# Patient Record
Sex: FEMALE | Race: BLACK OR AFRICAN AMERICAN | Employment: OTHER | URBAN - METROPOLITAN AREA
[De-identification: names, ages, dates, MRNs, and addresses within clinical notes are randomized per-mention and may not be internally consistent; named-entity substitution may affect disease eponyms.]

---

## 2018-12-10 PROBLEM — N18.9 CHRONIC KIDNEY DISEASE: Status: ACTIVE | Noted: 2018-12-10

## 2020-07-05 ENCOUNTER — HOSPITAL ENCOUNTER (INPATIENT)
Age: 85
LOS: 4 days | Discharge: OTHER INSTITUTION | DRG: 871 | End: 2020-07-09
Attending: EMERGENCY MEDICINE | Admitting: FAMILY MEDICINE
Payer: MEDICARE

## 2020-07-05 ENCOUNTER — APPOINTMENT (OUTPATIENT)
Dept: GENERAL RADIOLOGY | Age: 85
DRG: 871 | End: 2020-07-05
Payer: MEDICARE

## 2020-07-05 ENCOUNTER — APPOINTMENT (OUTPATIENT)
Dept: CT IMAGING | Age: 85
DRG: 871 | End: 2020-07-05
Payer: MEDICARE

## 2020-07-05 PROBLEM — J18.9 PNEUMONIA: Status: ACTIVE | Noted: 2020-07-05

## 2020-07-05 LAB
A/G RATIO: 0.7 (ref 1.1–2.2)
ALBUMIN SERPL-MCNC: 3.3 G/DL (ref 3.4–5)
ALP BLD-CCNC: 77 U/L (ref 40–129)
ALT SERPL-CCNC: 28 U/L (ref 10–40)
ANION GAP SERPL CALCULATED.3IONS-SCNC: 11 MMOL/L (ref 3–16)
AST SERPL-CCNC: 58 U/L (ref 15–37)
BACTERIA: ABNORMAL /HPF
BASOPHILS ABSOLUTE: 0 K/UL (ref 0–0.2)
BASOPHILS RELATIVE PERCENT: 0.5 %
BILIRUB SERPL-MCNC: 0.3 MG/DL (ref 0–1)
BILIRUBIN URINE: NEGATIVE
BLOOD, URINE: ABNORMAL
BUN BLDV-MCNC: 21 MG/DL (ref 7–20)
CALCIUM SERPL-MCNC: 10.4 MG/DL (ref 8.3–10.6)
CHLORIDE BLD-SCNC: 102 MMOL/L (ref 99–110)
CLARITY: CLEAR
CO2: 28 MMOL/L (ref 21–32)
COLOR: YELLOW
CREAT SERPL-MCNC: 1.7 MG/DL (ref 0.6–1.2)
D DIMER: >5250 NG/ML DDU (ref 0–229)
EOSINOPHILS ABSOLUTE: 0 K/UL (ref 0–0.6)
EOSINOPHILS RELATIVE PERCENT: 0.3 %
EPITHELIAL CELLS, UA: ABNORMAL /HPF (ref 0–5)
FIBRINOGEN: 729 MG/DL (ref 200–397)
FIBRINOGEN: 729 MG/DL (ref 200–397)
GFR AFRICAN AMERICAN: 34
GFR NON-AFRICAN AMERICAN: 28
GLOBULIN: 5 G/DL
GLUCOSE BLD-MCNC: 137 MG/DL (ref 70–99)
GLUCOSE URINE: NEGATIVE MG/DL
HCT VFR BLD CALC: 41.1 % (ref 36–48)
HEMOGLOBIN: 14 G/DL (ref 12–16)
HYALINE CASTS: ABNORMAL /LPF (ref 0–2)
INR BLD: 1.09 (ref 0.86–1.14)
KETONES, URINE: NEGATIVE MG/DL
LACTATE DEHYDROGENASE: 172 U/L (ref 100–190)
LACTIC ACID, SEPSIS: 1.9 MMOL/L (ref 0.4–1.9)
LEUKOCYTE ESTERASE, URINE: NEGATIVE
LYMPHOCYTES ABSOLUTE: 1.8 K/UL (ref 1–5.1)
LYMPHOCYTES RELATIVE PERCENT: 22.2 %
MCH RBC QN AUTO: 32.1 PG (ref 26–34)
MCHC RBC AUTO-ENTMCNC: 34 G/DL (ref 31–36)
MCV RBC AUTO: 94.6 FL (ref 80–100)
MICROSCOPIC EXAMINATION: YES
MONOCYTES ABSOLUTE: 0.9 K/UL (ref 0–1.3)
MONOCYTES RELATIVE PERCENT: 11.1 %
NEUTROPHILS ABSOLUTE: 5.3 K/UL (ref 1.7–7.7)
NEUTROPHILS RELATIVE PERCENT: 65.9 %
NITRITE, URINE: NEGATIVE
PDW BLD-RTO: 14.4 % (ref 12.4–15.4)
PH UA: 8.5 (ref 5–8)
PLATELET # BLD: 262 K/UL (ref 135–450)
PMV BLD AUTO: 9.3 FL (ref 5–10.5)
POTASSIUM REFLEX MAGNESIUM: 6 MMOL/L (ref 3.5–5.1)
POTASSIUM SERPL-SCNC: 5.1 MMOL/L (ref 3.5–5.1)
PRO-BNP: 132 PG/ML (ref 0–449)
PROCALCITONIN: 0.06 NG/ML (ref 0–0.15)
PROTEIN UA: 100 MG/DL
PROTHROMBIN TIME: 12.6 SEC (ref 10–13.2)
RBC # BLD: 4.34 M/UL (ref 4–5.2)
RBC UA: ABNORMAL /HPF (ref 0–4)
REASON FOR REJECTION: NORMAL
REJECTED TEST: NORMAL
SODIUM BLD-SCNC: 141 MMOL/L (ref 136–145)
SPECIFIC GRAVITY UA: >=1.03 (ref 1–1.03)
TOTAL PROTEIN: 8.3 G/DL (ref 6.4–8.2)
TROPONIN: <0.01 NG/ML
URINE REFLEX TO CULTURE: NO
URINE TYPE: ABNORMAL
UROBILINOGEN, URINE: 0.2 E.U./DL
WBC # BLD: 8 K/UL (ref 4–11)

## 2020-07-05 PROCEDURE — 86140 C-REACTIVE PROTEIN: CPT

## 2020-07-05 PROCEDURE — 87040 BLOOD CULTURE FOR BACTERIA: CPT

## 2020-07-05 PROCEDURE — 87449 NOS EACH ORGANISM AG IA: CPT

## 2020-07-05 PROCEDURE — 83615 LACTATE (LD) (LDH) ENZYME: CPT

## 2020-07-05 PROCEDURE — 6360000002 HC RX W HCPCS: Performed by: EMERGENCY MEDICINE

## 2020-07-05 PROCEDURE — 85379 FIBRIN DEGRADATION QUANT: CPT

## 2020-07-05 PROCEDURE — 96374 THER/PROPH/DIAG INJ IV PUSH: CPT

## 2020-07-05 PROCEDURE — 83605 ASSAY OF LACTIC ACID: CPT

## 2020-07-05 PROCEDURE — 84132 ASSAY OF SERUM POTASSIUM: CPT

## 2020-07-05 PROCEDURE — 83880 ASSAY OF NATRIURETIC PEPTIDE: CPT

## 2020-07-05 PROCEDURE — 85384 FIBRINOGEN ACTIVITY: CPT

## 2020-07-05 PROCEDURE — 85610 PROTHROMBIN TIME: CPT

## 2020-07-05 PROCEDURE — 1200000000 HC SEMI PRIVATE

## 2020-07-05 PROCEDURE — 82728 ASSAY OF FERRITIN: CPT

## 2020-07-05 PROCEDURE — 80053 COMPREHEN METABOLIC PANEL: CPT

## 2020-07-05 PROCEDURE — 93005 ELECTROCARDIOGRAM TRACING: CPT | Performed by: EMERGENCY MEDICINE

## 2020-07-05 PROCEDURE — 70450 CT HEAD/BRAIN W/O DYE: CPT

## 2020-07-05 PROCEDURE — 81001 URINALYSIS AUTO W/SCOPE: CPT

## 2020-07-05 PROCEDURE — 85025 COMPLETE CBC W/AUTO DIFF WBC: CPT

## 2020-07-05 PROCEDURE — 84145 PROCALCITONIN (PCT): CPT

## 2020-07-05 PROCEDURE — 84484 ASSAY OF TROPONIN QUANT: CPT

## 2020-07-05 PROCEDURE — 36415 COLL VENOUS BLD VENIPUNCTURE: CPT

## 2020-07-05 PROCEDURE — 71045 X-RAY EXAM CHEST 1 VIEW: CPT

## 2020-07-05 PROCEDURE — U0002 COVID-19 LAB TEST NON-CDC: HCPCS

## 2020-07-05 PROCEDURE — 2580000003 HC RX 258: Performed by: EMERGENCY MEDICINE

## 2020-07-05 PROCEDURE — 99285 EMERGENCY DEPT VISIT HI MDM: CPT

## 2020-07-05 PROCEDURE — 94760 N-INVAS EAR/PLS OXIMETRY 1: CPT

## 2020-07-05 PROCEDURE — U0003 INFECTIOUS AGENT DETECTION BY NUCLEIC ACID (DNA OR RNA); SEVERE ACUTE RESPIRATORY SYNDROME CORONAVIRUS 2 (SARS-COV-2) (CORONAVIRUS DISEASE [COVID-19]), AMPLIFIED PROBE TECHNIQUE, MAKING USE OF HIGH THROUGHPUT TECHNOLOGIES AS DESCRIBED BY CMS-2020-01-R: HCPCS

## 2020-07-05 RX ORDER — ACETAMINOPHEN 650 MG/1
650 SUPPOSITORY RECTAL EVERY 6 HOURS PRN
Status: DISCONTINUED | OUTPATIENT
Start: 2020-07-05 | End: 2020-07-09 | Stop reason: HOSPADM

## 2020-07-05 RX ORDER — ALBUTEROL SULFATE 90 UG/1
2 AEROSOL, METERED RESPIRATORY (INHALATION)
Status: DISCONTINUED | OUTPATIENT
Start: 2020-07-06 | End: 2020-07-07

## 2020-07-05 RX ORDER — PROMETHAZINE HYDROCHLORIDE 25 MG/1
12.5 TABLET ORAL EVERY 6 HOURS PRN
Status: DISCONTINUED | OUTPATIENT
Start: 2020-07-05 | End: 2020-07-09 | Stop reason: HOSPADM

## 2020-07-05 RX ORDER — PRAVASTATIN SODIUM 40 MG
40 TABLET ORAL DAILY
Status: DISCONTINUED | OUTPATIENT
Start: 2020-07-06 | End: 2020-07-09 | Stop reason: HOSPADM

## 2020-07-05 RX ORDER — LOSARTAN POTASSIUM 100 MG/1
100 TABLET ORAL DAILY
Status: ON HOLD | COMMUNITY
End: 2020-07-05 | Stop reason: ALTCHOICE

## 2020-07-05 RX ORDER — AMLODIPINE BESYLATE AND BENAZEPRIL HYDROCHLORIDE 10; 20 MG/1; MG/1
1 CAPSULE ORAL DAILY
COMMUNITY

## 2020-07-05 RX ORDER — SODIUM CHLORIDE 0.9 % (FLUSH) 0.9 %
10 SYRINGE (ML) INJECTION PRN
Status: DISCONTINUED | OUTPATIENT
Start: 2020-07-05 | End: 2020-07-09 | Stop reason: HOSPADM

## 2020-07-05 RX ORDER — IPRATROPIUM BROMIDE AND ALBUTEROL SULFATE 2.5; .5 MG/3ML; MG/3ML
1 SOLUTION RESPIRATORY (INHALATION)
Status: DISCONTINUED | OUTPATIENT
Start: 2020-07-06 | End: 2020-07-05 | Stop reason: ALTCHOICE

## 2020-07-05 RX ORDER — BUDESONIDE AND FORMOTEROL FUMARATE DIHYDRATE 80; 4.5 UG/1; UG/1
2 AEROSOL RESPIRATORY (INHALATION) 2 TIMES DAILY
Status: DISCONTINUED | OUTPATIENT
Start: 2020-07-05 | End: 2020-07-09 | Stop reason: HOSPADM

## 2020-07-05 RX ORDER — POLYETHYLENE GLYCOL 3350 17 G/17G
17 POWDER, FOR SOLUTION ORAL DAILY PRN
Status: DISCONTINUED | OUTPATIENT
Start: 2020-07-05 | End: 2020-07-09 | Stop reason: HOSPADM

## 2020-07-05 RX ORDER — DEXAMETHASONE SODIUM PHOSPHATE 10 MG/ML
8 INJECTION, SOLUTION INTRAMUSCULAR; INTRAVENOUS ONCE
Status: COMPLETED | OUTPATIENT
Start: 2020-07-05 | End: 2020-07-05

## 2020-07-05 RX ORDER — ONDANSETRON 2 MG/ML
4 INJECTION INTRAMUSCULAR; INTRAVENOUS EVERY 6 HOURS PRN
Status: DISCONTINUED | OUTPATIENT
Start: 2020-07-05 | End: 2020-07-09 | Stop reason: HOSPADM

## 2020-07-05 RX ORDER — ACETAMINOPHEN 325 MG/1
650 TABLET ORAL EVERY 6 HOURS PRN
Status: DISCONTINUED | OUTPATIENT
Start: 2020-07-05 | End: 2020-07-09 | Stop reason: HOSPADM

## 2020-07-05 RX ORDER — RISPERIDONE 0.5 MG/1
0.5 TABLET, FILM COATED ORAL NIGHTLY
Status: DISCONTINUED | OUTPATIENT
Start: 2020-07-05 | End: 2020-07-09 | Stop reason: HOSPADM

## 2020-07-05 RX ORDER — METOPROLOL TARTRATE 50 MG/1
50 TABLET, FILM COATED ORAL 2 TIMES DAILY
Status: DISCONTINUED | OUTPATIENT
Start: 2020-07-05 | End: 2020-07-09 | Stop reason: HOSPADM

## 2020-07-05 RX ORDER — DONEPEZIL HYDROCHLORIDE 5 MG/1
10 TABLET, FILM COATED ORAL NIGHTLY
Status: DISCONTINUED | OUTPATIENT
Start: 2020-07-05 | End: 2020-07-09 | Stop reason: HOSPADM

## 2020-07-05 RX ORDER — SODIUM CHLORIDE 0.9 % (FLUSH) 0.9 %
10 SYRINGE (ML) INJECTION EVERY 12 HOURS SCHEDULED
Status: DISCONTINUED | OUTPATIENT
Start: 2020-07-05 | End: 2020-07-09 | Stop reason: HOSPADM

## 2020-07-05 RX ADMIN — Medication 1 G: at 17:18

## 2020-07-05 RX ADMIN — DEXAMETHASONE SODIUM PHOSPHATE 8 MG: 10 INJECTION, SOLUTION INTRAMUSCULAR; INTRAVENOUS at 17:18

## 2020-07-05 RX ADMIN — AZITHROMYCIN MONOHYDRATE 500 MG: 500 INJECTION, POWDER, LYOPHILIZED, FOR SOLUTION INTRAVENOUS at 18:57

## 2020-07-05 ASSESSMENT — ENCOUNTER SYMPTOMS
STRIDOR: 0
COLOR CHANGE: 0
DIARRHEA: 0
COUGH: 1
BACK PAIN: 0
WHEEZING: 0
VOMITING: 0
NAUSEA: 0
ABDOMINAL DISTENTION: 0
CONSTIPATION: 0
SHORTNESS OF BREATH: 0
ABDOMINAL PAIN: 0

## 2020-07-05 ASSESSMENT — PAIN SCALES - GENERAL
PAINLEVEL_OUTOF10: 0
PAINLEVEL_OUTOF10: 0

## 2020-07-05 ASSESSMENT — PAIN SCALES - WONG BAKER
WONGBAKER_NUMERICALRESPONSE: 0
WONGBAKER_NUMERICALRESPONSE: 0

## 2020-07-05 NOTE — ED PROVIDER NOTES
normal IN, narrow QRS, normal QTc  ST segments: no ST elevations or depressions  T waves: no abnormal inversions  Non-specific T wave changes: present  Prior EKG comparison: EKG dated 3/15/15 is not significantly different    MDM:  Diagnostic considerations included stroke, TIA, intracranial bleed, trauma, infection/sepsis, medication side effect, intoxication/withdrawal, metabolic/electrolyte abnormalities    ED course was notable for AMS more than her baseline with lethargy and cough. Family later arrived, and does report cough recently. COVID pending. CXR questions pneumonia vs edema. Clinically edema less likely. Will order abx (rocephin/azithro for CAP) and admit. UA uninfected. Patient had hemolyzed potassium x2, with EKG showing no stigmata of hyperkalemia, recheck was 5.1. No MICHELLE. Lactate 1.9. Decadron ordered. During every aspect of this patient encounter, full droplet plus PPE precautions were used by myself. This patient's D-dimer test was ordered as a prognostic indicator for suspected COVID-19 infection, NOT to rule out pulmonary embolism. Thus, a CT of the chest for pulmonary embolism is not indicated at this time. During the patient's ED course, the patient was given:  Medications   cefTRIAXone (ROCEPHIN) 1 g in sterile water 10 mL IV syringe (has no administration in time range)   azithromycin (ZITHROMAX) 500 mg in D5W 250ml Vial Mate (has no administration in time range)   dexamethasone (PF) (DECADRON) injection 8 mg (has no administration in time range)        CLINICAL IMPRESSION  1. Altered mental status, unspecified altered mental status type    2. Pneumonia due to organism    3. Suspected COVID-19 virus infection        DISPOSITION  Itzel Gutierrez was admitted in fair condition. The plan is to admit to the hospital at this time under the hospitalist service. Dr. Daniel Lynch accepted the patient and will take over the patient's care.     This chart was created using Dragon dictation software. Efforts were made by me to ensure accuracy, however some errors may be present due to limitations of this technology.             Nidia Cuadra MD  07/05/20 2022

## 2020-07-05 NOTE — ED NOTES
Lab called to see if labs ordered can be added per [place] labs can be added, clarified sputum respiratory      Arjun Conn RN  07/05/20 0299

## 2020-07-05 NOTE — ED NOTES
covid sent and drawn via right nare w first set of labs, K redrawn per order, second set of blood cultures drawn via left fa.  Lactate drawn due to clotted in lab per order, pt awake at cr Sampson RN  07/05/20 4380

## 2020-07-05 NOTE — PROGRESS NOTES
Tried to obtain Sputum only got a very little thick white not enough to send down.  Will try again later when pt has something to produce

## 2020-07-05 NOTE — ED NOTES
Dr. Dixie Hernandez in room speaking w family and assessing pt     Ingris Winston, RN  07/05/20 [phone]

## 2020-07-05 NOTE — ED NOTES
daughter at bs. . pt oriented to name, toure inserted w out complication, pt unable to determine if she is at home or hospital, pt cal fernandez and Elijah Rose follow commands to squeeze RNs hand.       Hermelinda Duong RN  07/05/20 8268

## 2020-07-05 NOTE — ED NOTES
PA notified second K level drawn resulted in hemolysis, plan to have another RN or tech draw repeat for third time     Darius Cortez RN  07/05/20 2156

## 2020-07-05 NOTE — ED NOTES
Floor called and ER RN gave number for RN to call when she is done w receiving report on floor      Lanette Somers, AWAIS  07/05/20 1012

## 2020-07-05 NOTE — ED NOTES
Bed: 12  Expected date: 7/5/20  Expected time: 1:17 PM  Means of arrival: [facility]  Comments:  isaiah Patel  07/05/20 1977

## 2020-07-05 NOTE — ED PROVIDER NOTES
[facility]        Pt Name: Jazz Calhoun  MRN: [id number]  [place] 3/29/1930  Date of evaluation: 7/5/2020  Provider: Cristela Nolan PA-C  PCP: Quin Ceballos MD     I have seen and evaluated this patient with my supervising physician Alla Roca MD.    [address] [place] [address]       Chief Complaint   Patient presents with    Altered Mental Status     brought from home dt family concerns of altered mental status, pt had low bp in route, received 300ml ns       HISTORY OF PRESENT ILLNESS   (Location, Timing/Onset, Context/Setting, Quality, Duration, Modifying Factors, Severity, Associated Signs and Symptoms)  Note limiting factors. Jazz Calhoun is a 80 y.o. female who presents to the emergency department with daughter at bedside who states that patient has been less responsive over the past 2 hours. She does have some element of chronic confusion from Alzheimer's however more confused than normal.  Patient is alert but only oriented to person. She denies any acute pain. She has had a little bit of a cough for the past few days. No documented fevers or chills. Nursing Notes were all reviewed and agreed with or any disagreements were addressed in the HPI. REVIEW OF SYSTEMS    (2-9 systems for level 4, 10 or more for level 5)     Review of Systems   Constitutional: Positive for fatigue. Negative for chills and fever. HENT: Negative. Eyes: Negative for visual disturbance. Respiratory: Positive for cough. Negative for shortness of breath, wheezing and stridor. Cardiovascular: Negative for chest pain, palpitations and leg swelling. Gastrointestinal: Negative for abdominal distention, abdominal pain, constipation, diarrhea, nausea and vomiting. Genitourinary: Negative. Musculoskeletal: Negative for back pain, neck pain and neck stiffness. Skin: Negative for color change, pallor, rash and wound. Neurological: Positive for weakness (generalized). Negative for dizziness, tremors, seizures, syncope, facial asymmetry, speech difficulty, light-headedness, numbness and headaches. Psychiatric/Behavioral: Positive for confusion. All other systems reviewed and are negative. Positives and Pertinent negatives as per HPI. Except as noted above in the ROS, all other systems were reviewed and negative. PAST MEDICAL HISTORY     Past Medical History:   Diagnosis Date    [facility]     Bronchitis     COPD (chronic obstructive pulmonary disease) ([facility]     Hyperlipidemia     Hypertension          SURGICAL HISTORY   No past surgical history on file. CURRENTMEDICATIONS       Previous Medications    AMLODIPINE-BENAZEPRIL (LOTREL) 10-20 MG PER CAPSULE    TAKE 1 CAPSULE BY MOUTH DAILY    BUDESONIDE-FORMOTEROL (SYMBICORT) 160-4.5 MCG/ACT AERO    Inhale 2 puffs into the lungs 2 times daily. DONEPEZIL (ARICEPT) 10 MG TABLET    TAKE 1 TABLET BY MOUTH NIGHTLY    FLUTICASONE (FLONASE) 50 MCG/ACT NASAL SPRAY    2 SPRAYS BY EACH NOSTRIL ROUTE DAILY    FUROSEMIDE (LASIX) 20 MG TABLET    TAKE ONE TABLET BY MOUTH TWICE A DAY    IPRATROPIUM-ALBUTEROL (DUONEB) 0.5-2.5 (3) MG/3ML SOLN NEBULIZER SOLUTION    USE 1 VIAL IN NEBULIZER FOUR TIMES A DAY AS NEEDED    KETOCONAZOLE (NIZORAL) 2 % CREAM    APPLY TOPICALLY DAILY.     METOPROLOL TARTRATE (LOPRESSOR) 50 MG TABLET    TAKE 1 TABLET BY MOUTH 2 TIMES DAILY    PRAVASTATIN (PRAVACHOL) 40 MG TABLET    TAKE 1 TABLET BY MOUTH DAILY    RISPERIDONE (RISPERDAL) 0.5 MG TABLET    TAKE 1 TABLET BY MOUTH bid         ALLERGIES     Pcn [penicillins]    FAMILYHISTORY       Family History   Problem Relation Age of Onset    Hypertension Son     Hypertension Daughter           SOCIAL HISTORY       Social History     Tobacco Use    Smoking status: Current Every Day Smoker     Packs/day: 1.00     Types: Cigarettes    Smokeless tobacco: Never Used   Substance Use Topics  Alcohol use: No    Drug use: No       SCREENINGS             PHYSICAL EXAM    (up to 7 for level 4, 8 or more for level 5)     ED Triage Vitals [07/05/20 1331]   BP Temp Temp Source Pulse Resp SpO2 Height Weight   (!) 137/57 97.3 °F (36.3 °C) Tympanic 51 9 95 % 5' 4\" (1.626 m) 130 lb (59 kg)       Physical Exam  Vitals signs and nursing note reviewed. Constitutional:       Appearance: Normal appearance. She is well-developed. She is not toxic-appearing or diaphoretic. HENT:      Head: Normocephalic and atraumatic. Right Ear: External ear normal.      Left Ear: External ear normal.      Nose: Nose normal.      Mouth/Throat:      Mouth: Mucous membranes are moist.      Pharynx: Oropharynx is clear. Eyes:      General: No scleral icterus. Right eye: No discharge. Left eye: No discharge. Extraocular Movements: Extraocular movements intact. Conjunctiva/sclera: Conjunctivae normal.      Pupils: Pupils are equal, round, and reactive to light. Neck:      Musculoskeletal: Normal range of motion. Cardiovascular:      Rate and Rhythm: Bradycardia present. Pulmonary:      Effort: Pulmonary effort is normal.      Breath sounds: No wheezing. Abdominal:      General: Bowel sounds are normal. There is no distension. Palpations: Abdomen is soft. Tenderness: There is no abdominal tenderness. There is no right CVA tenderness or left CVA tenderness. Musculoskeletal: Normal range of motion. Skin:     General: Skin is warm and dry. Capillary Refill: Capillary refill takes less than 2 seconds. Coloration: Skin is not jaundiced or pale. Findings: No bruising, erythema, lesion or rash. Neurological:      Mental Status: She is alert. GCS: GCS eye subscore is 4. GCS verbal subscore is 4. GCS motor subscore is 6. Motor: Weakness (generalized) present.       Comments: Oriented x1  Generalized weakness without focal asymmetric weakness on exam.  No facial droop or slurred speech. Slow to follow commands. Slow to respond.    Psychiatric:         Mood and Affect: Mood normal.         Behavior: Behavior normal.         DIAGNOSTIC RESULTS   LABS:    Labs Reviewed   COMPREHENSIVE METABOLIC PANEL W/ REFLEX TO MG FOR LOW K - Abnormal; Notable for the following components:       Result Value    Potassium reflex Magnesium 6.0 (*)     Glucose 137 (*)     BUN 21 (*)     CREATININE 1.7 (*)     GFR Non- 28 (*)     GFR  34 (*)     Total Protein 8.3 (*)     Alb 3.3 (*)     Albumin/Globulin Ratio 0.7 (*)     AST 58 (*)     All other components within normal limits    Narrative:     CALL  [facility] tel. [phone],  Rejected Test Name LACDS/Called to:Sandra Oliver, 07/05/2020 14:11, by  Roxy King  [facility] tel. [phone],  Chemistry results called to and read back by RN, Shayna Gudino, 07/05/2020  14:24, by Natalio Walters  Performed at:  [facility] [address], [place], [facility] [place][note 2] [facility]   Phone [phone]   URINE RT REFLEX TO CULTURE - Abnormal; Notable for the following components:    Blood, Urine TRACE-INTACT (*)     pH, UA 8.5 (*)     Protein,  (*)     All other components within normal limits    Narrative:     Performed at:  [facility] [address], [place], [facility] [place][note 2] [facility]   Phone [phone]   MICROSCOPIC URINALYSIS - Abnormal; Notable for the following components:    Hyaline Casts, UA 3-5 (*)     Bacteria, UA 1+ (*)     All other components within normal limits    Narrative:     Performed at:  [facility] [address], [place], [facility] [place][note 2] [facility]   Phone [phone]   CULTURE, BLOOD 1   CULTURE, BLOOD 2   LEGIONELLA ANTIGEN, URINE   STREP PNEUMONIAE ANTIGEN   CULTURE, RESPIRATORY   CBC WITH AUTO DIFFERENTIAL    Narrative:     Performed at:  [facility] [address], [place], [facility] [place][note 2] [facility]   Phone [phone]   TROPONIN    Narrative:     CALL  [facility] tel. [phone],  Rejected Test Name LACDS/Called to:Sandra Pineda, 07/05/2020 14:11, by  [facility] tel. [phone],  Chemistry results called to and read back by Jourdan ERNANDEZ, 07/05/2020  14:24, by [facility]  Performed at:  [facility] [address], [facility], [address]   Phone [phone] PEPTIDE    Narrative:     [facility] tel. [phone],  Rejected Test Name LACDS/Called to:Sandra Pineda, 07/05/2020 14:11, by  [facility] tel. [phone],  Chemistry results called to and read back by Jourdan ERNANDEZ, 07/05/2020  14:24, by [facility]  Performed at:  [facility] [address], [facility], [address]   Phone [phone]   PROTIME-INR    Narrative:     Performed at:  [facility] [address], [facility], [address]   Phone [phone]   PROCALCITONIN    Narrative:     [facility] tel. [phone],  Rejected Test Name LACDS/Called to:Sandra Pineda, 07/05/2020 14:11, by  [facility]. [phone],  Chemistry results called to and read back by Jourdan ERNANDEZ, 07/05/2020  14:24, by [facility]  Performed at:  [facility] [address], [facility], [address]   Phone [phone]   SPECIMEN REJECTION    Narrative:     Hannah Hives [phone],  Rejected Test Name LACDS/Called to:Sandra Pineda, 07/05/2020 14:11, by  [facility]  Performed at:  [facility] [address], [facility], [address]   Phone [phone]   LACTATE, SEPSIS    Narrative:     Jed Hernandez [phone],  Rejected Test Name K/Called to:RN, Ayesha Shelley, 07/05/2020 15:15, by [facility]  Performed at:  [facility] [address], [facility], [address]   Phone [phone]    Narrative: CALL  Phong Temple [phone],  Rejected Test Name K/Called to:RNDakota, 07/05/2020 15:15, by [facility]  Performed at:  [facility] [address], [place], [address]   Phone [phone]   LACTIC ACID, PLASMA   COVID-19   POTASSIUM   FIBRINOGEN   C-REACTIVE PROTEIN   LACTATE DEHYDROGENASE   FERRITIN   D-DIMER, QUANTITATIVE       All other labs were within normal range or not returned as of this dictation. EKG: All EKG's are interpreted by the Emergency Department Physician in the absence of a cardiologist.  Please see their note for interpretation of EKG. RADIOLOGY:   Non-plain film images such as CT, Ultrasound and MRI are read by the radiologist. Plain radiographic images are visualized and preliminarily interpreted by the ED Provider with the below findings:        Interpretation per the Radiologist below, if available at the time of this note:    XR CHEST PORTABLE   Final Result   Patchy opacities in the mid to lower lungs bilaterally. Pneumonia and edema   considered. CT HEAD WO CONTRAST    (Results Pending)     Pending. See attending note for result       PROCEDURES   Unless otherwise noted below, none     Procedures    CRITICAL CARE TIME   N/A    CONSULTS:  See attending note    EMERGENCY DEPARTMENT COURSE and DIFFERENTIAL DIAGNOSIS/MDM:   Vitals:    Vitals:    07/05/20 1415 07/05/20 1430 07/05/20 1500 07/05/20 1515   BP: (!) 124/41  (!) 129/55 (!) 128/52   Pulse: 52 (!) 49 53 57   Resp: 14 16 14 14   Temp:       TempSrc:       SpO2: 95% 97% 100% 98%   Weight:       Height:           Patient was given the following medications:  Medications - No data to display        This patient presents to the emergency department with daughter who states that she is acutely confused. I do not appreciate any focal neurological deficits at this time. Patient does have acute on chronic renal insufficiency.   I called the lab and spoke with , Jaye Friends, who believes that the blood work is slightly hemolyzed. Therefore, we will have this redrawn to verify. EKG does not show acute peak T waves however. Chest x-ray shows possible pneumonia versus edema. COVID swab pending. As there is some suspicion for COVID, antibiotics withheld at this time until we discussed this further with hospitalist. As this is the end of my shift, my attending will be taking over further care, treatment, disposition. See his note for pending labs and ct imaging. Plan is likely admission for further evaluation/medical management. FINAL IMPRESSION      1. Altered mental status, unspecified altered mental status type    2. Pneumonia due to organism    3. Suspected COVID-19 virus infection          DISPOSITION/PLAN   DISPOSITION Decision To Admit 07/05/2020 03:39:26 PM      PATIENT REFERREDTO:  No follow-up provider specified.     DISCHARGE MEDICATIONS:  New Prescriptions    No medications on file       DISCONTINUED MEDICATIONS:  Discontinued Medications    No medications on file              (Please note that portions of this note were completed with a voice recognition program.  Efforts were made to edit the dictations but occasionally words are mis-transcribed.)    Scar Burt PA-C (electronically signed)            Scar Burt PA-C  07/05/20 1600

## 2020-07-05 NOTE — ED NOTES
RN notified floor RN with pt report that  RT needs to complete resp sputum culture, notified family is involved and pt has slurred [phone] Dalton Pimentel RN  07/05/20 5721

## 2020-07-06 ENCOUNTER — APPOINTMENT (OUTPATIENT)
Dept: CT IMAGING | Age: 85
DRG: 871 | End: 2020-07-06
Payer: MEDICARE

## 2020-07-06 LAB
A/G RATIO: 0.6 (ref 1.1–2.2)
ALBUMIN SERPL-MCNC: 2.8 G/DL (ref 3.4–5)
ALP BLD-CCNC: 71 U/L (ref 40–129)
ALT SERPL-CCNC: 23 U/L (ref 10–40)
ANION GAP SERPL CALCULATED.3IONS-SCNC: 14 MMOL/L (ref 3–16)
AST SERPL-CCNC: 43 U/L (ref 15–37)
BILIRUB SERPL-MCNC: <0.2 MG/DL (ref 0–1)
BUN BLDV-MCNC: 25 MG/DL (ref 7–20)
C-REACTIVE PROTEIN: 18.6 MG/L (ref 0–5.1)
CALCIUM SERPL-MCNC: 10.2 MG/DL (ref 8.3–10.6)
CHLORIDE BLD-SCNC: 101 MMOL/L (ref 99–110)
CO2: 24 MMOL/L (ref 21–32)
CREAT SERPL-MCNC: 1.3 MG/DL (ref 0.6–1.2)
EKG ATRIAL RATE: 49 BPM
EKG DIAGNOSIS: NORMAL
EKG P AXIS: 70 DEGREES
EKG P-R INTERVAL: 164 MS
EKG Q-T INTERVAL: 478 MS
EKG QRS DURATION: 68 MS
EKG QTC CALCULATION (BAZETT): 431 MS
EKG R AXIS: -26 DEGREES
EKG T AXIS: 44 DEGREES
EKG VENTRICULAR RATE: 49 BPM
FERRITIN: 753 NG/ML (ref 15–150)
GFR AFRICAN AMERICAN: 47
GFR NON-AFRICAN AMERICAN: 38
GLOBULIN: 5 G/DL
GLUCOSE BLD-MCNC: 106 MG/DL (ref 70–99)
GLUCOSE BLD-MCNC: 93 MG/DL (ref 70–99)
HCT VFR BLD CALC: 40.3 % (ref 36–48)
HEMOGLOBIN: 13 G/DL (ref 12–16)
L. PNEUMOPHILA SEROGP 1 UR AG: NORMAL
MCH RBC QN AUTO: 30.9 PG (ref 26–34)
MCHC RBC AUTO-ENTMCNC: 32.2 G/DL (ref 31–36)
MCV RBC AUTO: 96 FL (ref 80–100)
PDW BLD-RTO: 14.3 % (ref 12.4–15.4)
PERFORMED ON: NORMAL
PLATELET # BLD: 237 K/UL (ref 135–450)
PMV BLD AUTO: 9.7 FL (ref 5–10.5)
POTASSIUM SERPL-SCNC: 4.5 MMOL/L (ref 3.5–5.1)
RBC # BLD: 4.2 M/UL (ref 4–5.2)
SARS-COV-2, PCR: NOT DETECTED
SODIUM BLD-SCNC: 139 MMOL/L (ref 136–145)
STREP PNEUMONIAE ANTIGEN, URINE: NORMAL
TOTAL PROTEIN: 7.8 G/DL (ref 6.4–8.2)
WBC # BLD: 12.3 K/UL (ref 4–11)

## 2020-07-06 PROCEDURE — 99223 1ST HOSP IP/OBS HIGH 75: CPT | Performed by: PSYCHIATRY & NEUROLOGY

## 2020-07-06 PROCEDURE — 85027 COMPLETE CBC AUTOMATED: CPT

## 2020-07-06 PROCEDURE — 94640 AIRWAY INHALATION TREATMENT: CPT

## 2020-07-06 PROCEDURE — 93010 ELECTROCARDIOGRAM REPORT: CPT | Performed by: INTERNAL MEDICINE

## 2020-07-06 PROCEDURE — 6360000002 HC RX W HCPCS: Performed by: INTERNAL MEDICINE

## 2020-07-06 PROCEDURE — 6370000000 HC RX 637 (ALT 250 FOR IP): Performed by: FAMILY MEDICINE

## 2020-07-06 PROCEDURE — 6360000002 HC RX W HCPCS: Performed by: FAMILY MEDICINE

## 2020-07-06 PROCEDURE — 1200000000 HC SEMI PRIVATE

## 2020-07-06 PROCEDURE — 70450 CT HEAD/BRAIN W/O DYE: CPT

## 2020-07-06 PROCEDURE — 2580000003 HC RX 258: Performed by: FAMILY MEDICINE

## 2020-07-06 PROCEDURE — 80053 COMPREHEN METABOLIC PANEL: CPT

## 2020-07-06 PROCEDURE — 36415 COLL VENOUS BLD VENIPUNCTURE: CPT

## 2020-07-06 RX ORDER — LEVETIRACETAM 5 MG/ML
500 INJECTION INTRAVASCULAR EVERY 12 HOURS
Status: DISCONTINUED | OUTPATIENT
Start: 2020-07-07 | End: 2020-07-08

## 2020-07-06 RX ORDER — LORAZEPAM 2 MG/ML
INJECTION INTRAMUSCULAR
Status: DISPENSED
Start: 2020-07-06 | End: 2020-07-06

## 2020-07-06 RX ORDER — LORAZEPAM 2 MG/ML
1 INJECTION INTRAMUSCULAR ONCE
Status: DISCONTINUED | OUTPATIENT
Start: 2020-07-06 | End: 2020-07-09 | Stop reason: HOSPADM

## 2020-07-06 RX ORDER — LEVETIRACETAM 10 MG/ML
1000 INJECTION INTRAVASCULAR ONCE
Status: COMPLETED | OUTPATIENT
Start: 2020-07-06 | End: 2020-07-06

## 2020-07-06 RX ORDER — SODIUM CHLORIDE 9 MG/ML
INJECTION, SOLUTION INTRAVENOUS
Status: DISPENSED
Start: 2020-07-06 | End: 2020-07-07

## 2020-07-06 RX ADMIN — Medication 1 G: at 18:34

## 2020-07-06 RX ADMIN — Medication 10 ML: at 03:28

## 2020-07-06 RX ADMIN — AZITHROMYCIN MONOHYDRATE 500 MG: 500 INJECTION, POWDER, LYOPHILIZED, FOR SOLUTION INTRAVENOUS at 18:43

## 2020-07-06 RX ADMIN — Medication 10 ML: at 11:30

## 2020-07-06 RX ADMIN — Medication 10 ML: at 22:39

## 2020-07-06 RX ADMIN — LEVETIRACETAM INJECTION 1000 MG: 10 INJECTION INTRAVENOUS at 16:00

## 2020-07-06 RX ADMIN — ENOXAPARIN SODIUM 30 MG: 30 INJECTION SUBCUTANEOUS at 11:29

## 2020-07-06 ASSESSMENT — PAIN SCALES - GENERAL
PAINLEVEL_OUTOF10: 0

## 2020-07-06 ASSESSMENT — PAIN SCALES - WONG BAKER
WONGBAKER_NUMERICALRESPONSE: 0

## 2020-07-06 NOTE — PROGRESS NOTES
Nutrition Assessment    Type and Reason for Visit: Positive Nutrition Screen(Diffculty chewing/swallowing, pressure ulcer/wound)    Nutrition Recommendations:   1. Trial magic cup BID  2. Provide assistance with meals as mental status allows   3. Monitor for adequate PO intake  4. Document all PO intake in flow sheets     Nutrition Assessment: Pt is nutritionally compromised upon admission as evidenced by increased nutrient needs related to deep tissue injury to L heel. Pt is currently oriented to self only and has been lethargic/poorly responsive per RN. Pt has been unable to eat anything secondary to mental status. Pt did pass a swallow screen with nursing and currently has a pureed diet order as consistent with pre-admission diet per RN. Will offer magic cup BID and monitor for improved mental status/PO intake. Malnutrition Assessment:  · Malnutrition Status: Insufficient data  · Findings of the 6 clinical characteristics of malnutrition (Minimum of 2 out of 6 clinical characteristics is required to make the diagnosis of moderate or severe Protein Calorie Malnutrition based on AND/ASPEN Guidelines):  1. Energy Intake-Unable to assess,      2. Weight Loss-Unable to assess(No actual weight hx in EMR),    3. Fat Loss-Unable to assess(Droplet plus isolation),    4. Muscle Loss-Unable to assess(Droplet plus isolation),    5. Fluid Accumulation-Mild fluid accumulation,    6.  Strength-Not measured    Nutrition Risk Level: High    Nutrient Needs:  · Estimated Daily Total Kcal: 7009-2261  · Estimated Daily Protein (g):  grams   · Estimated Daily Total Fluid (ml/day): 1 mL per kcal     Nutrition Diagnosis:   · Problem: Increased nutrient needs  · Etiology: related to Increased demand for energy/nutrients     Signs and symptoms:  as evidenced by Presence of wounds    Objective Information:  · Nutrition-Focused Physical Findings: No edema noted. Oriented to person. Lethargic.    · Wound Type: Deep Tissue Injury(L heel)  · Current Nutrition Therapies:  · Oral Diet Orders: Dysphagia Pureed (Dysphagia 1)   · Oral Diet intake: 0%  · Oral Nutrition Supplement (ONS) Orders: None  · Anthropometric Measures:  · Ht: 5' 4\" (162.6 cm)   · Current Body Wt: 138 lb (62.6 kg)  · Ideal Body Wt: 120 lb (54.4 kg)   · BMI Classification: BMI 18.5 - 24.9 Normal Weight    Nutrition Interventions:   Continue current diet, Start ONS  Continued Inpatient Monitoring, Education Not Indicated    Nutrition Evaluation:   · Evaluation: Goals set   · Goals: Pt will consume at least 50% of meals and supplements     · Monitoring: Meal Intake, Supplement Intake, Diet Tolerance, Wound Healing, Chewing/Swallowing, Mental Status/Confusion      Electronically signed by Carline Quezada RD, LD on 7/6/20 at 2:46 PM EDT    Contact Number: [phone]

## 2020-07-06 NOTE — PLAN OF CARE
Problem: Falls - Risk of:  Goal: Will remain free from falls  Description: Will remain free from falls  7/6/2020 1627 by Patrice Lazo RN  Outcome: Met This Shift  7/6/2020 0341 by Jay Jay Huber RN  Outcome: Ongoing  Goal: Absence of physical injury  Description: Absence of physical injury  Outcome: Met This Shift     Problem: Skin Integrity:  Goal: Will show no infection signs and symptoms  Description: Will show no infection signs and symptoms  7/6/2020 1627 by Patrice Lazo RN  Outcome: Met This Shift  7/6/2020 0341 by Jay Jay Huber RN  Outcome: Ongoing  Goal: Absence of new skin breakdown  Description: Absence of new skin breakdown  7/6/2020 1627 by Patrice Lazo RN  Outcome: Met This Shift  7/6/2020 0341 by Jay Jay Huber RN  Outcome: Ongoing     Problem: Nutrition  Goal: Optimal nutrition therapy  7/6/2020 1627 by Patrice Lazo RN  Outcome: Met This Shift  7/6/2020 1451 by Carline Quezada RD, LD  Outcome: Ongoing

## 2020-07-06 NOTE — PLAN OF CARE
Nutrition Problem: Increased nutrient needs  Intervention: Food and/or Nutrient Delivery: Continue current diet, Start ONS  Nutritional Goals: Pt will consume at least 50% of meals and supplements

## 2020-07-06 NOTE — PROGRESS NOTES
Speech Language Pathology   Hold Note -SLP Evaluation Day 0  Name:  Jeovanny Mtz  :  3/29/1930  Medical Diagnosis: Pneumonia [J18.9]  Pneumonia [J18.9]    SLP/dysphagia evaluation orders received. Pt currently undergoing testing for COVID-19 and is in droplet plus isolation. Thorough clinical chart review completed by SLP in order to assess current aspiration risk as potential primary contributor to respiratory decline. Per infection control formal (face to face) clinical swallow evaluation was not completed at this time, in order to minimize infection exposure. Impressions:  Per informal assessment, Pt currently demonstrates mild-moderate risk indicators for potential dysphagia / aspiration per chart review. However, SLP will follow up with patient and complete formal clinical swallow assessment when Pt is no longer in COVID-19 isolation and as clinically indicated. Conclusion/Recommendations:   1) Recommend Pt remain on current diet of regular textures/thin liquids, meds as tolerated  2) Due to mild-moderate risk for dysphagia/aspiration per Pt history and current status, Formal clinical swallow evaluation IS recommended when Pt is no longer in COVID-19 isolation and prior to discharge from the hospital    Signature:   Jes Kumari M.S. [id number] [facility]  Speech-Language Pathologist

## 2020-07-06 NOTE — PLAN OF CARE
Problem: Falls - Risk of:  Goal: Will remain free from falls  Description: Will remain free from falls  Outcome: Ongoing     Problem: Skin Integrity:  Goal: Will show no infection signs and symptoms  Description: Will show no infection signs and symptoms  Outcome: Ongoing  Goal: Absence of new skin breakdown  Description: Absence of new skin breakdown  Outcome: Ongoing

## 2020-07-06 NOTE — DISCHARGE INSTR - COC
Continuity of Care Form    Patient Name: Ramila Crane   :  3/29/1930  MRN:  [id number]    Admit date:  2020  Discharge date:  2020    Code Status Order: DNR-CCA   Advance Directives:   [facility] Documentation     Date/Time Healthcare Directive Type of Healthcare Directive Copy in [address] Agent's Name Healthcare Agent's Phone Number    [phone]  Yes, patient has an advance directive for healthcare treatment  Durable power of  for health care  No, copy requested from family  Healthcare power of   --  --          Admitting Physician:  Debra Morales MD  PCP: Mazin Aguilar MD    Discharging Nurse: [facility]/Room#: 7TI-0515/2316-02  Discharging Unit Phone Number: [phone]    Emergency Contact:   Extended Emergency Contact Information  Primary Emergency Contact: Kimber Arias  Address: [address] Phone: [phone]  Relation: Child  Secondary Emergency Contact: Cami Cole  Address: [address] [place] [address] Phone: [phone]  Work Phone: [phone]  Relation: Child    Past Surgical History:  History reviewed. No pertinent surgical history.     Immunization History:   Immunization History   Administered Date(s) Administered    Influenza Virus Vaccine 2014       Active Problems:  Patient Active Problem List   Diagnosis Code    COPD exacerbation ([facility] [address] J44.1    Acute bronchitis J20.9    Essential hypertension I10    High cholesterol E78.00    Dysphagia R13.10    Dementia due to Alzheimer's disease ([facility] [address] G30.9, F02.80    Aspiration pneumonitis ([facility] [address] J69.0    Dyspnea R06.00    Chronic obstructive pulmonary disease ([facility] [address] J44.9    HTN (hypertension), benign I10    Acute exacerbation of chronic obstructive pulmonary disease (COPD) ([facility] J44.1    Chronic kidney disease N18.9    Pneumonia due to organism J18.9    Altered mental status R41.82    Acute encephalopathy G93.40    Suspected COVID-19 virus infection Z20.828    MICHELLE (acute kidney injury) ([facility] N17.9       Isolation/Infection:   Isolation          No Isolation        Patient Infection Status     Infection Onset Added Last Indicated Last Indicated By Review Planned Expiration Resolved Resolved By    None active    Resolved    COVID-19 Rule Out 07/05/20 07/05/20 07/05/20 COVID-19 (Ordered)   07/06/20 Rule-Out Test Resulted          Nurse Assessment:  Last Vital Signs: /76   Pulse 70   Temp 98.3 °F (36.8 °C) (Oral)   Resp 18   Ht 5' 4\" (1.626 m)   Wt 143 lb 1.3 oz (64.9 kg)   SpO2 95%   BMI 24.56 kg/m²     Last documented pain score (0-10 scale): Pain Level: 0  Last Weight:   Wt Readings from Last 1 Encounters:   07/08/20 143 lb 1.3 oz (64.9 kg)     Mental Status:  disoriented    IV Access:  - None    Nursing Mobility/ADLs:  Walking   Dependent  Transfer  Dependent  Bathing  Dependent  Dressing  Dependent  Toileting  Dependent  Feeding  Dependent  Med Admin  Dependent  Med Delivery   crushed    Wound Care Documentation and Therapy:  Wound 07/06/20 Heel Left;Dorsal completely black 2*1.5cm (Active)   Wound Pressure Unstageable 7/7/2020  8:39 AM   Dressing Status Clean;Dry; Intact 7/9/2020  9:30 AM   Dressing/Treatment Foam 7/9/2020  9:30 AM   Wound Length (cm) 2 cm 7/6/2020  3:00 AM   Wound Width (cm) 1.5 cm 7/6/2020  3:00 AM   Wound Surface Area (cm^2) 3 cm^2 7/6/2020  3:00 AM   Wound Assessment Black 7/7/2020  8:39 AM   Drainage Amount None 7/7/2020  4:00 AM   Odor None 7/7/2020  4:00 AM   Margins Attached edges; Unattached edges 7/7/2020  4:00 AM   Candace-wound Assessment Red 7/7/2020  4:00 AM   Non-staged Wound Description Not applicable 3/8/9066  7:55 AM   Number of days: 3       Wound 07/06/20 Coccyx (Active)   Dressing Status Clean;Dry; Intact 7/9/2020 9:30 AM   Dressing Changed Changed/New 7/8/2020  2:00 PM   Dressing/Treatment Foam 7/9/2020  9:30 AM   Wound Assessment Dry;Pink;Red 7/7/2020  4:00 AM   Drainage Amount None 7/7/2020  4:00 AM   Odor None 7/7/2020  4:00 AM   Margins Unattached edges 7/7/2020  4:00 AM   Candace-wound Assessment Non-blanchable erythema 7/7/2020  4:00 AM   Number of days: 3        Elimination:  Continence:   · Bowel: No  · Bladder: No  Urinary Catheter: None   Colostomy/Ileostomy/Ileal Conduit: No       Date of Last BM: 7/9/2020    Intake/Output Summary (Last 24 hours) at 7/9/2020 1452  Last data filed at 7/9/2020 0930  Gross per 24 hour   Intake 480 ml   Output 775 ml   Net -295 ml     I/O last 3 completed shifts: In: 5 [P.O.:720]  Out: 700 [Urine:700]    Safety Concerns: At Risk for Falls    Impairments/Disabilities:      Vision    Nutrition Therapy:  Current Nutrition Therapy:   Ensure 3x daily with meals. Routes of Feeding: Oral  Liquids: Thin Liquids  Daily Fluid Restriction: no  Last Modified Barium Swallow with Video (Video Swallowing Test): not done    Treatments at the Time of Hospital Discharge:   Respiratory Treatments: N/A  Oxygen Therapy:  is not on home oxygen therapy.   Ventilator:    - No ventilator support    Rehab Therapies: SN, PT,OT,ST  Weight Bearing Status/Restrictions: No weight bearing restirctions  Other Medical Equipment (for information only, NOT a DME order):  wheelchair, walker, bath bench and bedside commode  Other Treatments: Wound care:_Ostomy Care    Patient's personal belongings (please select all that are sent with patient):  Shirt, pants, socks undergarments     RN SIGNATURE:  Electronically signed by Cecelia Landry RN on 7/9/20 at 5:13 PM EDT    CASE MANAGEMENT/SOCIAL WORK SECTION    Inpatient Status Date: 7-5-20    Readmission Risk Assessment Score:  Readmission Risk              Risk of Unplanned Readmission:        15           Discharging to Facility/ Agency   Name: [facility]   [facility] will call for Appointment  Phone: [phone]  Fax: [phone]    Dialysis Facility (if applicable)     / signature: Electronically signed by BETH Bhatia on 7/9/20 at 2:25 PM EDT    PHYSICIAN SECTION    Prognosis: Good    Condition at Discharge: Stable    Rehab Potential (if transferring to Rehab): Fair    Recommended Labs or Other Treatments After Discharge:  Wound Care: CLEANSE L  HEEL AND COCCYX PRESSURE ULCERS WITH NSS. APPLY FOAM DSG WITH ADHESIVE BORDER  TWICE WEEKLY. MAY TEACH CARE GIVER DSG. CHANGE. Physician Certification: I certify the above information and transfer of Chay Mendez  is necessary for the continuing treatment of the diagnosis listed and that she requires Home Care for less 30 days.      Update Admission H&P: No change in H&P    PHYSICIAN SIGNATURE:  Electronically signed by Kaden Koch MD on 7/9/20 at 2:43 PM EDT

## 2020-07-06 NOTE — PROGRESS NOTES
Notified by Oksana Glasgow RN that pt's HR is in the 40's and pt is unresponsive, very shallow breathing. Entered pt's room to pt not responding to verbal stimuli, only to sternal rub. Pt unable to stay awake or converse. Pt's BUE and BLE not withdrawing from pain. Pt's pupils are pin point. VSS, BG 93. Dr. Queen Wayne made aware, came to bedside, ordered STAT CT head. Pt transferred to CT via bed.

## 2020-07-06 NOTE — PROGRESS NOTES
Patient arrived to room [phone] from ER. Fall precautions in place. Call light and bedside table within reach. Will continue to monitor.

## 2020-07-06 NOTE — CONSULTS
In patient Neurology consult        [facility] Neurology      MD Jose A Veliz  3/29/1930    Date of Service: 7/6/2020    Referring Physician: Mike Wilder MD    Most of the history was obtained from detailed chart reviewing and discussion with the patient's primary team. The patient is currently confused and unable to provide me with accurate history. Reason for the consult and CC: Acute encephalopathy    HPI:   The patient is a 80y.o.  years old female with multiple medical problems and history of dementia, COPD, hypertension and CKD who was admitted to the hospital at [facility] yesterday with acute encephalopathy. Symptoms started the day before admission. She was observed by her family to be somewhat more lethargic and confused at home. Degree was severe and persistent. Other associated symptom including more speech impairment and coughing. No witnessed seizure, falling to the ground or focal weakness. At baseline, she is nonambulatory. She does have baseline severe dementia Alzheimer disease. No other relieving or aggravating factors. She came to the ED for evaluation. Initial work-up with a chest x-ray showed possible pneumonia. She was treated with antibiotics. CT head showed diffuse atrophy but no acute changes. Blood test revealed hyponatremia and acute kidney injury. She was admitted for COVID-19 rule out. The patient is currently awake alert with waxing and waning. She is mute. She was able to answer any questions. Other view of system was limited at this point. Family History   Problem Relation Age of Onset    Hypertension Son     Hypertension Daughter          Past Medical History:   Diagnosis Date    Alzheimer disease ([address]     Bronchitis     COPD (chronic obstructive pulmonary disease) (HCC)     Hyperlipidemia     Hypertension      History reviewed. No pertinent surgical history.   Social History     Tobacco Use    Smoking status: Former Smoker     Packs/day: 1.00     Types: Cigarettes    Smokeless tobacco: Never Used   Substance Use Topics    Alcohol use: No    Drug use: No     Allergies   Allergen Reactions    Pcn [Penicillins]      Current Facility-Administered Medications   Medication Dose Route Frequency Provider Last Rate Last Dose    LORazepam (ATIVAN) 2 MG/ML injection             LORazepam (ATIVAN) injection 1 mg  1 mg Intravenous Once Amanda Ayala MD        [START ON 7/7/2020] enoxaparin (LOVENOX) injection 30 mg  30 mg Subcutaneous Daily Amanda Ayala MD        cefTRIAXone (ROCEPHIN) 1 g in sterile water 10 mL IV syringe  1 g Intravenous Q24H Lizeth Badillo MD        azithromycin (ZITHROMAX) 500 mg in D5W 250ml Vial Mate  500 mg Intravenous Q24H Lizeth Badillo MD        donepezil (ARICEPT) tablet 10 mg  10 mg Oral Nightly Lizeth Badillo MD        metoprolol tartrate (LOPRESSOR) tablet 50 mg  50 mg Oral BID Lizeth Badillo MD        pravastatin (PRAVACHOL) tablet 40 mg  40 mg Oral Daily Lizeth Badillo MD        risperiDONE (RISPERDAL) tablet 0.5 mg  0.5 mg Oral Nightly Lizeth Badillo MD        sodium chloride flush 0.9 % injection 10 mL  10 mL Intravenous 2 times per day Lizeth Badillo MD   10 mL at 07/06/20 1130    sodium chloride flush 0.9 % injection 10 mL  10 mL Intravenous PRN Lizeth Badillo MD        acetaminophen (TYLENOL) tablet 650 mg  650 mg Oral Q6H PRN MD Dilip Zimmerman   [facility] acetaminophen (TYLENOL) suppository 650 mg  650 mg Rectal Q6H PRN Lizeth Badillo MD        polyethylene glycol (GLYCOLAX) packet 17 g  17 g Oral Daily PRN Lizeth Badillo MD        promethazine (PHENERGAN) tablet 12.5 mg  12.5 mg Oral Q6H PRN Lizeth Badillo MD        Or    ondansetron (ZOFRAN) injection 4 mg  4 mg Intravenous Q6H PRN Lizeth Badillo MD        ipratropium (ATROVENT HFA) 17 MCG/ACT inhaler 2 puff  2 puff Inhalation Q4H BEAU White MD        albuterol sulfate  (90 Base) MCG/ACT inhaler 2 puff  2 puff Inhalation Q4H WA Brendan Chi MD        budesonide-formoterol (SYMBICORT) 80-4.5 MCG/ACT inhaler 2 puff  2 puff Inhalation BID Brendan Chi MD           ROS: 10-14 system review was limited due to MS changes or as per HPI. Constitutional:   Vitals:    07/05/20 2330 07/06/20 0300 07/06/20 0900 07/06/20 1131   BP: 109/79 (!) 144/60 (!) 139/57 (!) 149/60   Pulse: 66 53 56 57   Resp: 15 18 12 12   Temp: 96.5 °F (35.8 °C) 97.1 °F (36.2 °C)  96.1 °F (35.6 °C)   TempSrc: Temporal Temporal  Temporal   SpO2:  95% 98% 99%   Weight:  138 lb 10.7 oz (62.9 kg)     Height:           General appearance: Confused   Eye: PRRR  Fundus: Funduscopic examination could not be performed due to patient's poor cooperation and COVID-19 restriction. Neck: supple  Cardiovascular: No lower leg edema with good pulsation. Mental Status:   Waxing and waning with poor attention concentration. AAO x0  Poor attention and concentration. Waxing and waning. Unable to assess recent or remote memory due to confusion  Language: Mute   unable to assess fund of knowledge due to confusion. Cranial Nerves:   II: Visual fields: NT due to confusion. Pupils: equal, round, reactive to light  III,IV,VI: Extra Ocular Movements are intact. No nystagmus  V: Facial sensation : NT due to confusion  VII: Facial strength and movements: intact and symmetric  VIII: Hearing: NT due to confusion  IX: Palate elevation NT due to confusion  XI: Shoulder shrug: NT due to confusion  XII: Tongue movements: NT due to confusion  Musculoskeletal: Generalized diffuse weakness more in the legs 3/5 with diffuse atrophy and contractures. Tone: Normal tone. No rigidity. Reflexes: 1+ throughout in her arms and diminished in her legs. Planters: flexor bilaterally.   Coordination: NT due to confusion  Sensation: NT due to confusion  Gait/Posture: NT due to confusion    Data:  LABS:   Lab Results   Component Value Date     07/06/2020    K 4.5 07/06/2020    K 6.0 07/05/2020     07/06/2020    CO2 24 07/06/2020    BUN 25 07/06/2020    CREATININE 1.3 07/06/2020    GFRAA 47 07/06/2020    GFRAA >60 04/08/2013    LABGLOM 38 07/06/2020    GLUCOSE 106 07/06/2020    PHOS 3.0 03/12/2013    MG 1.6 03/12/2013    CALCIUM 10.2 07/06/2020     Lab Results   Component Value Date    WBC 12.3 07/06/2020    RBC 4.20 07/06/2020    HGB 13.0 07/06/2020    HCT 40.3 07/06/2020    MCV 96.0 07/06/2020    RDW 14.3 07/06/2020     07/06/2020     Lab Results   Component Value Date    INR 1.09 07/05/2020    PROTIME 12.6 07/05/2020       Neuroimaging were independently reviewed by me. Reviewed notes from different physicians  Reviewed lab and blood testing    Impression:  Acute encephalopathy, severe. Rule out acute metabolic encephalopathy, underlying sepsis or URI. Rule out possible COVID-19. Less likely meningoencephalitis although will be in our differential if she continues to spike any fever or encephalopathy does not improve. Acute kidney injury  Acute respiratory distress  Hypertension  Dementia of Alzheimer disease, severe  Generalized debilitation and weakness      Recommendation:  Continue current supportive care  Follow COVID 19 titer and restrictions  Continue antibiotics  Respiratory support  Hydration  Follow CBC and CMP  Continue current blood pressure medications and avoid hypotension below 120-80  DVT and GI prophylaxis  Aspirin  Low threshold for LP under IR if she spikes another fever or encephalopathy worse  Prognosis is guarded given advanced age and poor baseline  Further work-up to consider after obtaining COVID-19 results  Discussed with primary team  We will follow. Thank you for referring such patient. If you have any questions regarding my consult note, please don't hesitate to call me. Lisa Corona MD  [phone]    This dictation was generated by voice recognition computer software.  Although all attempts are made to edit the dictation for accuracy, there may be errors in the  transcription that are not intended

## 2020-07-06 NOTE — CARE COORDINATION
CM contacted patient daughter, Veldon Goldmann via phone  RN/discharge planner met with patient/ (and family member) to discuss reason for admission, current living situation, and potential needs at the time of discharge    Demographics/Insurance verified Yes  Medicare & Medicaid    Current type of dwelling: single level home    Patient from ECF/ confirmed with: n/a    Living arrangements: with daughter Andrzej Gift of function/Support: patient has dementia requires max assist for all ADL    PCP:Patricia    Last Visit to PCP: 4/13/2020    DME: shower chair, hospital bed, lift recliner. Barbara Varela NEEDS bedside commode, air mattress     Active with any community resources/agencies/skilled home care: CoA involved, not very active with them. No home care agencies at the present    Medication compliance issues: difficulty swallowing meds, poor appetite. Refuses medications, takes them in pudding or applesauce    Financial issues that could impact healthcare:none known    Tentative discharge plan: home with family    Discussed and provided facilities of choice if transition to a skilled nursing facility is required at the time of discharge--family states they will take pt hhoe  itthm      Discussed with patient and/or family that on the day of discharge home tentative time of discharge will be between 10 AM and noon.     Transportation at the time of discharge: family

## 2020-07-06 NOTE — PROGRESS NOTES
4 Eyes Skin Assessment     The patient is being assess for  Admission    I agree that 2 RN's have performed a thorough Head to Toe Skin Assessment on the patient. ALL assessment sites listed below have been assessed. Areas assessed by both nurses:  [x]   Head, Face, and Ears   [x]   Shoulders, Back, and Chest  [x]   Arms, Elbows, and Hands   [x]   Coccyx, Sacrum, and IschIum  [x]   Legs, Feet, and Heels        Does the Patient have Skin Breakdown?   Yes LDA WOUND CARE was Initiated documentation include the Candace-wound, Wound Assessment, Measurements, Dressing Treatment, Drainage, and Color\",         Kishor Prevention initiated:  Yes   Wound Care Orders initiated:  No      WOC nurse consulted for Pressure Injury (Stage 3,4, Unstageable, DTI, NWPT, and Complex wounds), New and Established Ostomies:  Yes      Nurse 1 eSignature: Electronically signed by Nickolas Luque RN on 7/6/20 at 3:30 AM EDT    **SHARE this note so that the co-signing nurse is able to place an eSignature**    Nurse 2 eSignature: Electronically signed by Jen Engel RN on 7/6/20 at 3:41 AM EDT

## 2020-07-06 NOTE — PROGRESS NOTES
Entered pt's room to pt lying with eyes open, able to tell this RN her name. Pt also able to squeeze my hands, very weak grasps, able to move toes to bilateral feet.

## 2020-07-06 NOTE — H&P
HOSPITALISTS HISTORY AND PHYSICAL    7/5/2020 9:40 PM    Patient Information:  Nam Dela Cruz is a 80 y.o. female [id number]  PCP:  Federica Ramirez MD (Tel: [phone] )    Chief complaint:    Chief Complaint   Patient presents with    Altered Mental Status     brought from home dt family concerns of altered mental status, pt had low bp in route, received 300ml ns        History of Present Illness:  Sneha Zabala is a 80 y.o. female with h/o COPD, CKD, dementia, HTN, brought in by family with worsening confusion, lethargy and decreased respoinsiveness. She has cough in the past a few days. No fever, chills. Chest xray showed concern for pneumonia        REVIEW OF SYSTEMS:   Constitutional: Negative for fever,chills or night sweats  ENT: Negative for rhinorrhea, epistaxis, hoarseness, sore throat. Respiratory: Negative for shortness of breath,wheezing  Cardiovascular: Negative for chest pain, palpitations   Gastrointestinal: Negative for nausea, vomiting, diarrhea  Genitourinary: Negative for polyuria, dysuria   Hematologic/Lymphatic: Negative for bleeding tendency, easy bruising  Musculoskeletal: Negative for myalgias and arthralgias  Neurologic: Negative for confusion,dysarthria. Skin: Negative for itching,rash  Psychiatric: Negative for depression,anxiety, agitation. Endocrine: Negative for polydipsia,polyuria,heat /cold intolerance. Past Medical History:   has a past medical history of Alzheimer disease ([facility] Bronchitis, COPD (chronic obstructive pulmonary disease) ([facility] Hyperlipidemia, and Hypertension. Past Surgical History:   has no past surgical history on file. Medications:  No current facility-administered medications on file prior to encounter. Current Outpatient Medications on File Prior to Encounter   Medication Sig Dispense Refill    ketoconazole (NIZORAL) 2 % cream APPLY TOPICALLY DAILY.  60 g 1    amLODIPine-benazepril (LOTREL) 10-20 MG per capsule TAKE 1 CAPSULE BY MOUTH DAILY 30 capsule 3    donepezil (ARICEPT) 10 MG tablet TAKE 1 TABLET BY MOUTH NIGHTLY 30 tablet 3    pravastatin (PRAVACHOL) 40 MG tablet TAKE 1 TABLET BY MOUTH DAILY 30 tablet 3    metoprolol tartrate (LOPRESSOR) 50 MG tablet TAKE 1 TABLET BY MOUTH 2 TIMES DAILY 60 tablet 3    furosemide (LASIX) 20 MG tablet TAKE ONE TABLET BY MOUTH TWICE A DAY 60 tablet 3    fluticasone (FLONASE) 50 MCG/ACT nasal spray 2 SPRAYS BY EACH NOSTRIL ROUTE DAILY 16 g 5    risperiDONE (RISPERDAL) 0.5 MG tablet TAKE 1 TABLET BY MOUTH bid 60 tablet 5    ipratropium-albuterol (DUONEB) 0.5-2.5 (3) MG/3ML SOLN nebulizer solution USE 1 VIAL IN NEBULIZER FOUR TIMES A DAY AS NEEDED 90 mL 5    budesonide-formoterol (SYMBICORT) 160-4.5 MCG/ACT AERO Inhale 2 puffs into the lungs 2 times daily. 1 Inhaler 3       Allergies: Allergies   Allergen Reactions    Pcn [Penicillins]         Social History:   reports that she has been smoking cigarettes. She has been smoking about 1.00 pack per day. She has never used smokeless tobacco. She reports that she does not drink alcohol or use drugs. Family History:  family history includes Hypertension in her daughter and son. ,     Physical Exam:  BP (!) 144/61   Pulse 57   Temp 96.8 °F (36 °C) (Temporal)   Resp 15   Ht 5' 4\" (1.626 m)   Wt 130 lb (59 kg)   SpO2 96%   BMI 22.31 kg/m²     General appearance:  Appears comfortable. Well nourished  Eyes: Sclera clear, pupils equal  ENT: Moist mucus membranes, no thrush. Trachea midline. Cardiovascular: Regular rhythm, normal S1, S2. No murmur, gallop, rub. No edema in lower extremities  Respiratory: Clear to auscultation bilaterally, no wheeze, good inspiratory effort  Gastrointestinal: Abdomen soft, non-tender, not distended, normal bowel sounds  Musculoskeletal: No cyanosis in digits, neck supple  Neurology: Cranial nerves grossly intact.  Alert and oriented in time,

## 2020-07-06 NOTE — PROGRESS NOTES
Hospitalist Progress Note      PCP: Patricia Grant MD    Date of Admission: 7/5/2020    Chief Complaint:  [address] Course:    Lamonte Owen is a 80 y.o. female with h/o COPD, CKD, dementia, HTN, brought in by family with worsening confusion, lethargy and decreased respoinsiveness. She has cough in the past a few days. No fever, chills. Chest xray showed concern for pneumonia. This morning she was almost unresponsive prompting me to get another CT head to rule out a bleed. There as no bleed present. She has some fasiculation like movement of her toes and fluttering of her eyes which make me think that she may be having seizures. Pupils where pinpoint and she did not respond to narcan   She will get a little more responsive and then get worse again. She appears to he waxing and waning a but. Neuro consulted. I have loaded her with Keppra. Subjective:   Waxes and wanes, makes me concerned for seizures.        Medications:  Reviewed    Infusion Medications   Scheduled Medications    LORazepam        LORazepam  1 mg Intravenous Once    [START ON 7/7/2020] enoxaparin  30 mg Subcutaneous Daily    [START ON 7/7/2020] levetiracetam  500 mg Intravenous Q12H    cefTRIAXone (ROCEPHIN) IV  1 g Intravenous Q24H    azithromycin  500 mg Intravenous Q24H    donepezil  10 mg Oral Nightly    metoprolol tartrate  50 mg Oral BID    pravastatin  40 mg Oral Daily    risperiDONE  0.5 mg Oral Nightly    sodium chloride flush  10 mL Intravenous 2 times per day    ipratropium  2 puff Inhalation Q4H WA    albuterol sulfate HFA  2 puff Inhalation Q4H WA    budesonide-formoterol  2 puff Inhalation BID     PRN Meds: sodium chloride flush, acetaminophen **OR** acetaminophen, polyethylene glycol, promethazine **OR** ondansetron      Intake/Output Summary (Last 24 hours) at 7/6/2020 1649  Last data filed at 7/6/2020 0300  Gross per 24 hour   Intake --   Output 250 ml   Net -250 ml       Physical Exam Merlene Goodson 994 NEGATIVE 01/11/2012       Radiology:  CT HEAD WO CONTRAST   Final Result   No acute intracranial abnormality. No change in the appearance of the study   since yesterday. Moderate to severe cerebral atrophy present appropriate for age. Large   amount of chronic ischemic change also age appropriate. CT HEAD WO CONTRAST   Final Result   No acute intracranial abnormality. XR CHEST PORTABLE   Final Result   Patchy opacities in the mid to lower lungs bilaterally. Pneumonia and edema   considered. Assessment/Plan:    Active Hospital Problems    Diagnosis    Altered mental status [R41.82]    Acute encephalopathy [G93.40]    Suspected COVID-19 virus infection [Z20.828]    MICHELLE (acute kidney injury) ([facility] [address] [N17.9]    Pneumonia due to organism [J18.9]    HTN (hypertension), benign [I10]    Dementia due to Alzheimer's disease ([facility] [address] [G30.9, F02.80]     AMS  -Suspect due to seizures cvs pneumonia. - will discuss with neuro  - loaded with Keppra    Pneumonia  Cultures including COVID is sent  Will treat with IV abx    MICHELLE on CKD  - better after fluids. Hx of previous strokes  - based off of CT  - nothing suggestive of new stroke on the new ct. Consider MRI     Dementia  - unclear how progressed or what patient was like at baseline,. DVT Prophylaxis: lovenox  Diet: DIET DYSPHAGIA PUREED; Dietary Nutrition Supplements: Frozen Oral Supplement  Code Status: Full Code    PT/OT Eval Status: eval and treat  When improved. Dispo -ccc    40 minutes of critical care time spent.      Gregg Cm MD

## 2020-07-06 NOTE — PROGRESS NOTES
Notified by [facility] that pt's HR dropped to 39, went into pt's room, pt lying in bed, initially pt awaken by verbal stimuli but eventually pt was unable to arouse via sternal rub and only responded to pain. Dr. Carol Robertson made aware, IV Keppra ordered. Will continue to monitor.

## 2020-07-07 LAB
D DIMER: >5250 NG/ML DDU (ref 0–229)
EKG ATRIAL RATE: 52 BPM
EKG DIAGNOSIS: NORMAL
EKG P AXIS: 72 DEGREES
EKG P-R INTERVAL: 176 MS
EKG Q-T INTERVAL: 458 MS
EKG QRS DURATION: 80 MS
EKG QTC CALCULATION (BAZETT): 425 MS
EKG R AXIS: -31 DEGREES
EKG T AXIS: 31 DEGREES
EKG VENTRICULAR RATE: 52 BPM
FIBRINOGEN: 643 MG/DL (ref 200–397)

## 2020-07-07 PROCEDURE — 6370000000 HC RX 637 (ALT 250 FOR IP): Performed by: FAMILY MEDICINE

## 2020-07-07 PROCEDURE — 95816 EEG AWAKE AND DROWSY: CPT

## 2020-07-07 PROCEDURE — 36415 COLL VENOUS BLD VENIPUNCTURE: CPT

## 2020-07-07 PROCEDURE — 94761 N-INVAS EAR/PLS OXIMETRY MLT: CPT

## 2020-07-07 PROCEDURE — 85379 FIBRIN DEGRADATION QUANT: CPT

## 2020-07-07 PROCEDURE — 6360000002 HC RX W HCPCS: Performed by: INTERNAL MEDICINE

## 2020-07-07 PROCEDURE — 85384 FIBRINOGEN ACTIVITY: CPT

## 2020-07-07 PROCEDURE — 2580000003 HC RX 258: Performed by: FAMILY MEDICINE

## 2020-07-07 PROCEDURE — 93005 ELECTROCARDIOGRAM TRACING: CPT | Performed by: INTERNAL MEDICINE

## 2020-07-07 PROCEDURE — 92610 EVALUATE SWALLOWING FUNCTION: CPT

## 2020-07-07 PROCEDURE — 6370000000 HC RX 637 (ALT 250 FOR IP): Performed by: INTERNAL MEDICINE

## 2020-07-07 PROCEDURE — 6360000002 HC RX W HCPCS: Performed by: FAMILY MEDICINE

## 2020-07-07 PROCEDURE — 95816 EEG AWAKE AND DROWSY: CPT | Performed by: PSYCHIATRY & NEUROLOGY

## 2020-07-07 PROCEDURE — 2060000000 HC ICU INTERMEDIATE R&B

## 2020-07-07 PROCEDURE — 99233 SBSQ HOSP IP/OBS HIGH 50: CPT | Performed by: PSYCHIATRY & NEUROLOGY

## 2020-07-07 PROCEDURE — 92526 ORAL FUNCTION THERAPY: CPT

## 2020-07-07 PROCEDURE — 93010 ELECTROCARDIOGRAM REPORT: CPT | Performed by: INTERNAL MEDICINE

## 2020-07-07 PROCEDURE — 94640 AIRWAY INHALATION TREATMENT: CPT

## 2020-07-07 RX ORDER — IPRATROPIUM BROMIDE AND ALBUTEROL SULFATE 2.5; .5 MG/3ML; MG/3ML
1 SOLUTION RESPIRATORY (INHALATION)
Status: DISCONTINUED | OUTPATIENT
Start: 2020-07-07 | End: 2020-07-09 | Stop reason: HOSPADM

## 2020-07-07 RX ADMIN — Medication 1 G: at 17:55

## 2020-07-07 RX ADMIN — IPRATROPIUM BROMIDE AND ALBUTEROL SULFATE 1 AMPULE: .5; 3 SOLUTION RESPIRATORY (INHALATION) at 19:47

## 2020-07-07 RX ADMIN — BUDESONIDE AND FORMOTEROL FUMARATE DIHYDRATE 2 PUFF: 80; 4.5 AEROSOL RESPIRATORY (INHALATION) at 19:47

## 2020-07-07 RX ADMIN — Medication 10 ML: at 15:00

## 2020-07-07 RX ADMIN — LEVETIRACETAM 500 MG: 5 INJECTION INTRAVENOUS at 03:30

## 2020-07-07 RX ADMIN — Medication 10 ML: at 11:58

## 2020-07-07 RX ADMIN — Medication 10 ML: at 20:37

## 2020-07-07 RX ADMIN — LEVETIRACETAM 500 MG: 5 INJECTION INTRAVENOUS at 15:00

## 2020-07-07 RX ADMIN — AZITHROMYCIN MONOHYDRATE 500 MG: 500 INJECTION, POWDER, LYOPHILIZED, FOR SOLUTION INTRAVENOUS at 17:55

## 2020-07-07 RX ADMIN — IPRATROPIUM BROMIDE AND ALBUTEROL SULFATE 1 AMPULE: .5; 3 SOLUTION RESPIRATORY (INHALATION) at 16:07

## 2020-07-07 RX ADMIN — ENOXAPARIN SODIUM 30 MG: 30 INJECTION SUBCUTANEOUS at 11:57

## 2020-07-07 RX ADMIN — RISPERIDONE 0.5 MG: 0.5 TABLET ORAL at 20:36

## 2020-07-07 RX ADMIN — PRAVASTATIN SODIUM 40 MG: 40 TABLET ORAL at 11:57

## 2020-07-07 RX ADMIN — METOPROLOL TARTRATE 50 MG: 50 TABLET, FILM COATED ORAL at 11:57

## 2020-07-07 RX ADMIN — Medication 10 ML: at 17:55

## 2020-07-07 RX ADMIN — DONEPEZIL HYDROCHLORIDE 10 MG: 5 TABLET, FILM COATED ORAL at 20:36

## 2020-07-07 ASSESSMENT — PAIN SCALES - WONG BAKER
WONGBAKER_NUMERICALRESPONSE: 0

## 2020-07-07 ASSESSMENT — PAIN SCALES - GENERAL
PAINLEVEL_OUTOF10: 0

## 2020-07-07 NOTE — PROGRESS NOTES
Apparent decreased pharyngeal clearing  Coughing (delayed) with 1/6 thin liquid trials    Patient/Family Education:Education, results and recommendations given to the Pt and nurse, who verbalized understanding    Timed Code Treatment:0 min    Total Treatment Time: 25 min    Discharge Recommendations: Speech Therapy for Speech/Dysphagia treatment at discharge. If patient discharges prior to next session this note will serve as a discharge summary.       Raya Faulkner [facility]#[id number]

## 2020-07-07 NOTE — PROGRESS NOTES
MD notified of low heart rate. BP stable, O2 saturation % on room air. Patient alert. EKG ordered. Will continue to monitor.

## 2020-07-07 NOTE — PROGRESS NOTES
The pt alert, smiles, oriented to her name only when asked. Did not resist or has sign of discomfort when repositioning.

## 2020-07-07 NOTE — PROGRESS NOTES
Hospitalist Progress Note      PCP: Quin Ceballos MD    Date of Admission: 7/5/2020    Chief Complaint:  [address] Course:    Jazz Calhoun is a 80 y.o. female with h/o COPD, CKD, dementia, HTN, brought in by family with worsening confusion, lethargy and decreased respoinsiveness. She has cough in the past a few days. No fever, chills. Chest xray showed concern for pneumonia. This morning she was almost unresponsive prompting me to get another CT head to rule out a bleed. There as no bleed present. She has some fasiculation like movement of her toes and fluttering of her eyes which make me think that she may be having seizures. Pupils where pinpoint and she did not respond to narcan   She will get a little more responsive and then get worse again. She appears to he waxing and waning a but. Neuro consulted. I have loaded her with Keppra. Subjective:   She is awake and following commands today.        Medications:  Reviewed    Infusion Medications   Scheduled Medications    ipratropium-albuterol  1 ampule Inhalation Q4H WA    LORazepam  1 mg Intravenous Once    enoxaparin  30 mg Subcutaneous Daily    levetiracetam  500 mg Intravenous Q12H    cefTRIAXone (ROCEPHIN) IV  1 g Intravenous Q24H    azithromycin  500 mg Intravenous Q24H    donepezil  10 mg Oral Nightly    metoprolol tartrate  50 mg Oral BID    pravastatin  40 mg Oral Daily    risperiDONE  0.5 mg Oral Nightly    sodium chloride flush  10 mL Intravenous 2 times per day    budesonide-formoterol  2 puff Inhalation BID     PRN Meds: sodium chloride flush, acetaminophen **OR** acetaminophen, polyethylene glycol, promethazine **OR** ondansetron      Intake/Output Summary (Last 24 hours) at 7/7/2020 1649  Last data filed at 7/7/2020 1314  Gross per 24 hour   Intake --   Output 550 ml   Net -550 ml       Physical Exam Performed:    BP (!) 157/79   Pulse 54   Temp 97.7 °F (36.5 °C) (Oral)   Resp 16   Ht 5' 4\" (1.626 m)   Wt 141 lb 1.5 oz (64 kg)   SpO2 93%   BMI 24.22 kg/m²     General appearance: No apparent distress, appears stated age and cooperative. Answers questions   HEENT: Pupils equal pinpoint. Conjunctivae/corneas clear. Neck: Supple, with full range of motion. No jugular venous distention. Trachea midline. Respiratory:  Normal respiratory effort. But breathing shallow. Clear to auscultation, bilaterally without Rales/Wheezes/Rhonchi. Cardiovascular: Regular rate and rhythm with normal S1/S2 without murmurs, rubs or gallops. Abdomen: Soft, non-tender, non-distended with normal bowel sounds. Musculoskeletal: No clubbing, cyanosis or edema bilaterally. Not really moving. Skin: Skin color, texture, turgor normal.  No rashes or lesions. Neurologic:  Grossly intact. . Moves all limbs  Psychiatric: Alert to self, follows commands. Capillary Refill: Brisk,< 3 seconds   Peripheral Pulses: +2 palpable, equal bilaterally       Labs:   Recent Labs     07/05/20  1345 07/06/20  0929   WBC 8.0 12.3*   HGB 14.0 13.0   HCT 41.1 40.3    237     Recent Labs     07/05/20  1345 07/05/20  1620 07/06/20  0929     --  139   K 6.0* 5.1 4.5     --  101   CO2 28  --  24   BUN 21*  --  25*   CREATININE 1.7*  --  1.3*   CALCIUM 10.4  --  10.2     Recent Labs     07/05/20  1345 07/06/20  0929   AST 58* 43*   ALT 28 23   BILITOT 0.3 <0.2   ALKPHOS 77 71     Recent Labs     07/05/20  1345   INR 1.09     Recent Labs     07/05/20  1345   TROPONINI <0.01       Urinalysis:      Lab Results   Component Value Date    NITRU Negative 07/05/2020    WBCUA 20-40 04/08/2013    BACTERIA 1+ 07/05/2020    RBCUA 0-2 07/05/2020    BLOODU TRACE-INTACT 07/05/2020    SPECGRAV >=1.030 07/05/2020    GLUCOSEU Negative 07/05/2020    GLUCOSEU NEGATIVE 01/11/2012       Radiology:  CT HEAD WO CONTRAST   Final Result   No acute intracranial abnormality. No change in the appearance of the study   since yesterday.       Moderate to severe cerebral atrophy present appropriate for age. Large   amount of chronic ischemic change also age appropriate. CT HEAD WO CONTRAST   Final Result   No acute intracranial abnormality. XR CHEST PORTABLE   Final Result   Patchy opacities in the mid to lower lungs bilaterally. Pneumonia and edema   considered. Assessment/Plan:    Active Hospital Problems    Diagnosis    Altered mental status [R41.82]    Acute encephalopathy [G93.40]    Suspected COVID-19 virus infection [Z20.828]    MICHELLE (acute kidney injury) ([facility] [address] [N17.9]    Pneumonia due to organism [J18.9]    HTN (hypertension), benign [I10]    Dementia due to Alzheimer's disease ([facility] [address] [G30.9, F02.80]     AMS  -Suspect due to seizures cvs pneumonia. - will discuss with neuro  - loaded with Keppra    Sepsis- poa  - due to pneumonia. - see Below. Pneumonia  Cultures including COVID is sent  Will treat with IV abx  -COVID was not detected. MICHELLE on CKD  - better after fluids. Hx of previous strokes  - based off of CT  - nothing suggestive of new stroke on the new ct. Consider MRI     Dementia  - unclear how progressed or what patient was like at baseline     necrotic pressure ulcer of the left  Heel, POA  DVT Prophylaxis: lovenox  Diet: Dietary Nutrition Supplements: Frozen Oral Supplement  DIET DYSPHAGIA MINCED AND MOIST; No Drinking Straw  Code Status: Full Code    PT/OT Eval Status: eval and treat  When improved. Dispo -ccc    30 minutes of critical care time spent.      Domenica Nayak MD

## 2020-07-07 NOTE — PROGRESS NOTES
Morning assessment completed, pt continue to be have low respiratory  rate and cardiac rate patient denies needs at this time, call light in reach, will continue to monitor.

## 2020-07-07 NOTE — CONSULTS
Palliative Care:     80y.o.  years old female with history of dementia, COPD, hypertension and CKD presented to ED with acute encephalopathy, lethargy, confusion. Patient is  nonambulatory. She has severe dementia Alzheimer disease. Chest x-ray showed possible pneumonia. She was treated with antibiotics. CT head showed diffuse atrophy but no acute changes. COVID 19 is negative. Speech therapy is following for mild to moderate oropharyngeal dysphagia. Neurology is following. She is on ASA for stroke prevention. Radiology:  CT HEAD WO CONTRAST   Final Result   No acute intracranial abnormality. No change in the appearance of the study   since yesterday.       Moderate to severe cerebral atrophy present appropriate for age. Large   amount of chronic ischemic change also age appropriate.           CT HEAD WO CONTRAST   Final Result   No acute intracranial abnormality.           XR CHEST PORTABLE   Final Result   Patchy opacities in the mid to lower lungs bilaterally. Pneumonia and edema   considered.               Past Medical History:   has a past medical history of Alzheimer disease ([facility] [address] Bronchitis, COPD (chronic obstructive pulmonary disease) ([facility] [address] Hyperlipidemia, and Hypertension. Past Surgical History:   has no past surgical history on file. Advance Directives:      Code status is full, daughter is DPOA    Problem Severity: Pain/Other Symptoms:      Patient is on Risperdal at . She has Tylenol available for pain as needed. Bed Mobility/Toileting/Transfer:      Patient requires max assist with ADLs. Performance Status:      Patient is not ambulatory. She has a hospital bed and a lift chair at home. Symptom Assessment: Appetite/Nausea/Bowels/Fatigue:     Speech therapy is following for dysphagia--patient is on a  Dysphagia II Minced and Moist with thin liquids/no straws/meds with puree. Dietician is following. Patient was started on [employer].      Social History:   reports that she has quit smoking. Her smoking use included cigarettes. She smoked 1.00 pack per day. She has never used smokeless tobacco. She reports that she does not drink alcohol or use drugs. Family History:  family history includes Hypertension in her daughter and son. Psychological/Spiritual:       Patient lives with her daughter Shabbir Plasencia. She is a . Patient has 6 daughters living locally. Patient has 4 sons. Family Discussion:     Met with patient. She is smiling and is non verbal at this time. Spoke with daughter in the room and contacted daughter Shabbir Plasencia with whom she lives. Reviewed clinical status in detail  Daughter is committed to the care of patient. [facility]. Would benefit from gait belt at home to assist with transfers. Patient also needs BSC and air mattress. Patient has a hospital bed at home. She is able stand pivot to chair at home with the assistance of her daughter. Discussed end of life wishes with daughter. She does agree to DNR CCA. Patient would also benefit from Visiting Physician--will provide information. Plans home with daughter.

## 2020-07-07 NOTE — PROGRESS NOTES
Patient alert to voice at this time. Able to answer yes/no questions. Denies further needs at this time.

## 2020-07-07 NOTE — PROGRESS NOTES
Morning assessment completed, pt is alert and respond to yes and no questions, patient denies needs at this time, call light in reach, will continue to monitor.

## 2020-07-07 NOTE — PROGRESS NOTES
Pt shift assessment completed. Pt is is unresponsive and only redraws to pain. Pt respiration are regular and unlabored and on room air . Breath sounds diminished. Pt was bradycardic during assessment  Fluids infusing in right  PIV. Scheduled medications given as ordered. Patient encouraged to use call light with any needs. Patient states understanding, call light in reach, bed alarm on. All safety checks in place and will continue to monitor pt.

## 2020-07-07 NOTE — PROGRESS NOTES
Romain Holden  Neurology Follow-up  [facility]    Date of Service: 7/7/2020    Subjective:   CC: Follow up today regarding: Acute encephalopathy. Events noted. Chart and lab reviewed. The patient looks more awake and alert today. She was able to follow directions. She denies any headache, double vision or blurred vision. The same chronic generalized diffuse weakness. Blood test reviewed and showed creatinine 1.3 and sodium 139. COVID-19 titer came back negative. She denies any headache, double vision or blurred vision or breathing difficulties. No high-grade fever. No neck pain. The patient was having recurrent episode of staring spells yesterday afternoon. 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 No known history of seizure disorder. Other review of system was limited. ROS : A 10-12 system review could not be obtained due to poor cooperation and confusion. family history includes Hypertension in her daughter and son.     Past Medical History:   Diagnosis Date    Alzheimer disease ([facility] [address]     Bronchitis     [facility]     Hyperlipidemia     Hypertension      Current Facility-Administered Medications   Medication Dose Route Frequency Provider Last Rate Last Dose    LORazepam (ATIVAN) injection 1 mg  1 mg Intravenous Once Zelalem Flowers MD        enoxaparin (LOVENOX) injection 30 mg  30 mg Subcutaneous Daily Zelalem Flowers MD        levetiracetam (KEPPRA) 500 mg/100 mL IVPB  500 mg Intravenous Q12H Zelalem Flowers MD   Stopped at 07/07/20 0517    cefTRIAXone (ROCEPHIN) 1 g in sterile water 10 mL IV syringe  1 g Intravenous Q24H Connie White MD   1 g at 07/06/20 1834    azithromycin (ZITHROMAX) 500 mg in D5W 250ml Vial Mate  500 mg Intravenous Q24H Fabiana Brown MD   Stopped at 07/06/20 2020    donepezil (ARICEPT) tablet 10 mg  10 mg Oral Nightly Fabiana Brown MD Stopped at 07/06/20 2238    metoprolol tartrate (LOPRESSOR) tablet 50 mg  50 mg Oral BID Analisa Bowens MD   Stopped at 07/06/20 2238    pravastatin (PRAVACHOL) tablet 40 mg  40 mg Oral Daily Analisa Bowens MD        risperiDONE (RISPERDAL) tablet 0.5 mg  0.5 mg Oral Nightly Analisa Bowens MD   Stopped at 07/06/20 2239    sodium chloride flush 0.9 % injection 10 mL  10 mL Intravenous 2 times per day Analisa Bowens MD   10 mL at 07/06/20 2239    sodium chloride flush 0.9 % injection 10 mL  10 mL Intravenous PRN Analisa Bowens MD        acetaminophen (TYLENOL) tablet 650 mg  650 mg Oral Q6H PRN Analisa Bowens MD        Or   Ferrer acetaminophen (TYLENOL) suppository 650 mg  650 mg Rectal Q6H PRN Analisa Bowens MD        polyethylene glycol (GLYCOLAX) packet 17 g  17 g Oral Daily PRN Analisa Bowens MD        promethazine (PHENERGAN) tablet 12.5 mg  12.5 mg Oral Q6H PRN Analisa Bowens MD        Or    ondansetron (ZOFRAN) injection 4 mg  4 mg Intravenous Q6H PRN Analisa Bowens MD        ipratropium (ATROVENT HFA) 17 MCG/ACT inhaler 2 puff  2 puff Inhalation Q4H WA Analisa Bowens MD        albuterol sulfate  (90 Base) MCG/ACT inhaler 2 puff  2 puff Inhalation Q4H WA Analisa Bowens MD        budesonide-formoterol (SYMBICORT) 80-4.5 MCG/ACT inhaler 2 puff  2 puff Inhalation BID Analisa Bowens MD         Allergies   Allergen Reactions    Pcn [Penicillins]       reports that she has quit smoking. Her smoking use included cigarettes. She smoked 1.00 pack per day. She has never used smokeless tobacco. She reports that she does not drink alcohol or use drugs.          Objective:  Constitutional:   Vitals:    07/06/20 2000 07/06/20 2344 07/07/20 0400 07/07/20 0830   BP: (!) 106/54 124/61 (!) 137/48 (!) 155/54   Pulse: 50 (!) 45 51 58   Resp: 14 11 12    Temp: 96.2 °F (35.7 °C) 96.1 °F (35.6 °C) 96.3 °F (35.7 °C)    TempSrc: Temporal Temporal Temporal    SpO2: 97% 98% 96% 97%   Weight:   141 lb 1.5 oz (64 kg)    Height: General appearance: Confused but more awake and alert today. She can follow direction today. Eye: No icterus. PRRR  Neck: supple  Cardiovascular:  No lower leg edema with good pulsation. Mental Status:   AAO times one. Attention and concentration: poor, waxing and waning. Language: Soft but able to follow direction. Recent and remote memory: Poor recent and remote  Poor fund of knowledge  Cranial Nerves:   II: Pupils: equal, round, reactive to light  III,IV,VI: No gaze preference. No nystagmus  V: Facial sensation: Grossly unremarkable. VII: Facial strength and movements: intact and symmetric  IX: Palate elevation midline   XI: Shoulder shrug symmetric  XII: Tongue movements : midline  Musculoskeletal: Generalized diffuse weakness 3+/5 with diffuse atrophy. The same. Looks chronic. Tone: Normal tone. No rigidity. Reflexes: 1-2 in arms and legs  Planters: flexor bilaterally. Coordination, sensation and gait cannot be examined due to confusion      Data:  LABS:   Lab Results   Component Value Date     07/06/2020    K 4.5 07/06/2020    K 6.0 07/05/2020     07/06/2020    CO2 24 07/06/2020    BUN 25 07/06/2020    CREATININE 1.3 07/06/2020    GFRAA 47 07/06/2020    GFRAA >60 04/08/2013    LABGLOM 38 07/06/2020    GLUCOSE 106 07/06/2020    PHOS 3.0 03/12/2013    MG 1.6 03/12/2013    CALCIUM 10.2 07/06/2020     Lab Results   Component Value Date    WBC 12.3 07/06/2020    RBC 4.20 07/06/2020    HGB 13.0 07/06/2020    HCT 40.3 07/06/2020    MCV 96.0 07/06/2020    RDW 14.3 07/06/2020     07/06/2020     Lab Results   Component Value Date    INR 1.09 07/05/2020    PROTIME 12.6 07/05/2020       Neuroimaging and labs were independently reviewed by me  Reviewed notes from different physicians. Impression:  Acute encephalopathy, severe. Likely acute metabolic encephalopathy. Could be related to URI and pneumonia. COVID-19 came back negative.   Less likely CNS infection with improvement in her mentation today. ?  New onset seizure although seems less likely. She is on Keppra. We will get EEG. If EEG is normal, I will discontinue Keppra due to increased risk of worsening kidney function testing. Chronic cognitive impairment and baseline dementia of Alzheimer disease, severe  Pneumonia  Acute respiratory distress with hypoxia, improved  Acute kidney injury  Generalized debilitation and weakness. Recommendation  Continue current supportive care  EEG  PT and OT  Speech evaluation  Aspiration precaution  Telemetry  Continue current antibiotics  No need for transfer to [facility] at this point as the patient was able to follow directions today and seems more awake and alert  Continue Keppra for now pending EEG results  Consider MRI brain when she is medically stable and more awake and alert  DVT and GI prophylaxis  Statin  Aspirin for stroke prevention  Discussed with her nurse  We will follow. Daisy Tapia MD   [phone]      This dictation was generated by voice recognition computer software. Although all attempts are made to edit the dictation for accuracy, there may be errors in the transcription that are not intended.

## 2020-07-08 LAB
ANION GAP SERPL CALCULATED.3IONS-SCNC: 12 MMOL/L (ref 3–16)
BUN BLDV-MCNC: 23 MG/DL (ref 7–20)
CALCIUM SERPL-MCNC: 9.8 MG/DL (ref 8.3–10.6)
CHLORIDE BLD-SCNC: 100 MMOL/L (ref 99–110)
CO2: 27 MMOL/L (ref 21–32)
CREAT SERPL-MCNC: 1.2 MG/DL (ref 0.6–1.2)
D DIMER: >5250 NG/ML DDU (ref 0–229)
FIBRINOGEN: 506 MG/DL (ref 200–397)
GFR AFRICAN AMERICAN: 51
GFR NON-AFRICAN AMERICAN: 42
GLUCOSE BLD-MCNC: 81 MG/DL (ref 70–99)
HCT VFR BLD CALC: 38.1 % (ref 36–48)
HEMOGLOBIN: 12.5 G/DL (ref 12–16)
MCH RBC QN AUTO: 31.3 PG (ref 26–34)
MCHC RBC AUTO-ENTMCNC: 32.9 G/DL (ref 31–36)
MCV RBC AUTO: 95.2 FL (ref 80–100)
PDW BLD-RTO: 14.2 % (ref 12.4–15.4)
PLATELET # BLD: 241 K/UL (ref 135–450)
PMV BLD AUTO: 9.2 FL (ref 5–10.5)
POTASSIUM SERPL-SCNC: 4.2 MMOL/L (ref 3.5–5.1)
RBC # BLD: 4 M/UL (ref 4–5.2)
SODIUM BLD-SCNC: 139 MMOL/L (ref 136–145)
WBC # BLD: 6.2 K/UL (ref 4–11)

## 2020-07-08 PROCEDURE — 85384 FIBRINOGEN ACTIVITY: CPT

## 2020-07-08 PROCEDURE — 99232 SBSQ HOSP IP/OBS MODERATE 35: CPT | Performed by: PSYCHIATRY & NEUROLOGY

## 2020-07-08 PROCEDURE — 6370000000 HC RX 637 (ALT 250 FOR IP): Performed by: INTERNAL MEDICINE

## 2020-07-08 PROCEDURE — 6370000000 HC RX 637 (ALT 250 FOR IP): Performed by: FAMILY MEDICINE

## 2020-07-08 PROCEDURE — 2580000003 HC RX 258: Performed by: FAMILY MEDICINE

## 2020-07-08 PROCEDURE — 85379 FIBRIN DEGRADATION QUANT: CPT

## 2020-07-08 PROCEDURE — 94640 AIRWAY INHALATION TREATMENT: CPT

## 2020-07-08 PROCEDURE — 2060000000 HC ICU INTERMEDIATE R&B

## 2020-07-08 PROCEDURE — 6360000002 HC RX W HCPCS: Performed by: FAMILY MEDICINE

## 2020-07-08 PROCEDURE — 36415 COLL VENOUS BLD VENIPUNCTURE: CPT

## 2020-07-08 PROCEDURE — 85027 COMPLETE CBC AUTOMATED: CPT

## 2020-07-08 PROCEDURE — 80048 BASIC METABOLIC PNL TOTAL CA: CPT

## 2020-07-08 PROCEDURE — 6360000002 HC RX W HCPCS: Performed by: INTERNAL MEDICINE

## 2020-07-08 PROCEDURE — 94761 N-INVAS EAR/PLS OXIMETRY MLT: CPT

## 2020-07-08 RX ADMIN — PRAVASTATIN SODIUM 40 MG: 40 TABLET ORAL at 10:05

## 2020-07-08 RX ADMIN — DONEPEZIL HYDROCHLORIDE 10 MG: 5 TABLET, FILM COATED ORAL at 20:31

## 2020-07-08 RX ADMIN — BUDESONIDE AND FORMOTEROL FUMARATE DIHYDRATE 2 PUFF: 80; 4.5 AEROSOL RESPIRATORY (INHALATION) at 20:13

## 2020-07-08 RX ADMIN — RISPERIDONE 0.5 MG: 0.5 TABLET ORAL at 20:31

## 2020-07-08 RX ADMIN — AZITHROMYCIN MONOHYDRATE 500 MG: 500 INJECTION, POWDER, LYOPHILIZED, FOR SOLUTION INTRAVENOUS at 18:50

## 2020-07-08 RX ADMIN — Medication 1 G: at 18:50

## 2020-07-08 RX ADMIN — Medication 10 ML: at 10:05

## 2020-07-08 RX ADMIN — ENOXAPARIN SODIUM 30 MG: 30 INJECTION SUBCUTANEOUS at 10:05

## 2020-07-08 RX ADMIN — IPRATROPIUM BROMIDE AND ALBUTEROL SULFATE 1 AMPULE: .5; 3 SOLUTION RESPIRATORY (INHALATION) at 20:13

## 2020-07-08 RX ADMIN — BUDESONIDE AND FORMOTEROL FUMARATE DIHYDRATE 2 PUFF: 80; 4.5 AEROSOL RESPIRATORY (INHALATION) at 07:45

## 2020-07-08 RX ADMIN — Medication 10 ML: at 20:31

## 2020-07-08 RX ADMIN — Medication 10 ML: at 18:50

## 2020-07-08 RX ADMIN — IPRATROPIUM BROMIDE AND ALBUTEROL SULFATE 1 AMPULE: .5; 3 SOLUTION RESPIRATORY (INHALATION) at 11:43

## 2020-07-08 RX ADMIN — LEVETIRACETAM 500 MG: 5 INJECTION INTRAVENOUS at 03:20

## 2020-07-08 RX ADMIN — IPRATROPIUM BROMIDE AND ALBUTEROL SULFATE 1 AMPULE: .5; 3 SOLUTION RESPIRATORY (INHALATION) at 07:45

## 2020-07-08 ASSESSMENT — PAIN SCALES - GENERAL
PAINLEVEL_OUTOF10: 0

## 2020-07-08 NOTE — PROGRESS NOTES
James Winston  Neurology Follow-up  [facility] Neurology    Date of Service: 7/8/2020    Subjective:   CC: Follow up today regarding: Acute encephalopathy. Events noted. Chart and lab reviewed. The patient looks much better today. She is awake and alert. She is back to her baseline. She had a EEG yesterday which showed no epileptiform discharges. She denies any headache, chest pain, dysphagia or dysarthria. No focal weakness except for chronic generalized weakness. No new symptoms today. Other review of system was unremarkable. ROS : A 10-12 system review could not be obtained due to poor cooperation and confusion. family history includes Hypertension in her daughter and son.     Past Medical History:   Diagnosis Date    [facility] [address] [address]     Bronchitis     COPD (chronic obstructive pulmonary disease) [facility] [address]     Hyperlipidemia     Hypertension      Current Facility-Administered Medications   Medication Dose Route Frequency Provider Last Rate Last Dose    ipratropium-albuterol (DUONEB) nebulizer solution 1 ampule  1 ampule Inhalation Q4H WA Brian Nguyễn MD   1 ampule at 07/08/20 0745    LORazepam (ATIVAN) injection 1 mg  1 mg Intravenous Once Brian Nguyễn MD        enoxaparin (LOVENOX) injection 30 mg  30 mg Subcutaneous Daily Brian Nguyễn MD   30 mg at 07/08/20 1005    levetiracetam (KEPPRA) 500 mg/100 mL IVPB  500 mg Intravenous Q12H Brian Nguyễn MD   Stopped at 07/08/20 0347    cefTRIAXone (ROCEPHIN) 1 g in sterile water 10 mL IV syringe  1 g Intravenous Q24H Connie White MD   1 g at 07/07/20 1755    azithromycin (ZITHROMAX) 500 mg in D5W 250ml Vial Mate  500 mg Intravenous Q24H Raul Urias MD   Stopped at 07/07/20 1938    donepezil (ARICEPT) tablet 10 mg  10 mg Oral Nightly Raul Urias MD   10 mg at 07/07/20 2036    metoprolol tartrate (LOPRESSOR) tablet 50 mg  50 mg Oral BID Raul Urias MD   50 mg at 07/07/20 1157    pravastatin (PRAVACHOL) tablet 40 mg  40 mg Oral Daily Kareem Lugo MD   40 mg at 07/08/20 1005    risperiDONE (RISPERDAL) tablet 0.5 mg  0.5 mg Oral Nightly Kareem Lugo MD   0.5 mg at 07/07/20 2036    sodium chloride flush 0.9 % injection 10 mL  10 mL Intravenous 2 times per day Kareem Lugo MD   10 mL at 07/08/20 1005    sodium chloride flush 0.9 % injection 10 mL  10 mL Intravenous PRN Kareem Lugo MD   10 mL at 07/07/20 1755    acetaminophen (TYLENOL) tablet 650 mg  650 mg Oral Q6H PRN Kareem Lugo MD        Or   [facility] acetaminophen (TYLENOL) suppository 650 mg  650 mg Rectal Q6H PRN Kareem Lugo MD        polyethylene glycol (GLYCOLAX) packet 17 g  17 g Oral Daily PRN Kareem Lugo MD        promethazine (PHENERGAN) tablet 12.5 mg  12.5 mg Oral Q6H PRN Kareem Lugo MD        Or    ondansetron (ZOFRAN) injection 4 mg  4 mg Intravenous Q6H PRN Kareem Lugo MD        budesonide-formoterol (SYMBICORT) 80-4.5 MCG/ACT inhaler 2 puff  2 puff Inhalation BID Kareem Lugo MD   2 puff at 07/08/20 0745     Allergies   Allergen Reactions    Pcn [Penicillins]       reports that she has quit smoking. Her smoking use included cigarettes. She smoked 1.00 pack per day. She has never used smokeless tobacco. She reports that she does not drink alcohol or use drugs. Objective:  Constitutional:   Vitals:    07/07/20 2357 07/08/20 0417 07/08/20 0753 07/08/20 0945   BP: (!) 148/75 (!) 145/63  (!) 134/55   Pulse: 51 (!) 47  (!) 46   Resp: 16 16  16   Temp: 97.9 °F (36.6 °C) 98.3 °F (36.8 °C)  98.2 °F (36.8 °C)   TempSrc: Oral Oral  Oral   SpO2: 96% 97% 92% 100%   Weight:  143 lb 1.3 oz (64.9 kg)     Height:           General appearance: no acute distress   eye: No icterus. PRRR  Neck: supple  Cardiovascular:  No lower leg edema with good pulsation. Mental Status:   AAO times one. Attention and concentration: Good attention today  Language: Soft but able to follow direction.   Recent and remote memory: Poor recent and remote  Poor fund of knowledge  Cranial Nerves:   II: Pupils: equal, round, reactive to light  III,IV,VI: No gaze preference. No nystagmus  V: Facial sensation: Grossly unremarkable. VII: Facial strength and movements: intact and symmetric  IX: Palate elevation midline   XI: Shoulder shrug symmetric  XII: Tongue movements : midline  Musculoskeletal: Generalized diffuse weakness 3+/5 with diffuse atrophy. The same. Looks chronic. Tone: Normal tone. No rigidity. Reflexes: 1-2 in arms and legs  Planters: flexor bilaterally. Coordination: No tremors  Sensation is normal  Gait cannot be tested due to generalized weakness. Data:  LABS:   Lab Results   Component Value Date     07/06/2020    K 4.5 07/06/2020    K 6.0 07/05/2020     07/06/2020    CO2 24 07/06/2020    BUN 25 07/06/2020    CREATININE 1.3 07/06/2020    GFRAA 47 07/06/2020    GFRAA >60 04/08/2013    LABGLOM 38 07/06/2020    GLUCOSE 106 07/06/2020    PHOS 3.0 03/12/2013    MG 1.6 03/12/2013    CALCIUM 10.2 07/06/2020     Lab Results   Component Value Date    WBC 12.3 07/06/2020    RBC 4.20 07/06/2020    HGB 13.0 07/06/2020    HCT 40.3 07/06/2020    MCV 96.0 07/06/2020    RDW 14.3 07/06/2020     07/06/2020     Lab Results   Component Value Date    INR 1.09 07/05/2020    PROTIME 12.6 07/05/2020       Neuroimaging and labs were independently reviewed by me  Reviewed notes from different physicians. Impression:  Acute encephalopathy. Likely acute metabolic encephalopathy. Improved compared to few days ago. Chronic cognitive impairment and baseline dementia of Alzheimer disease, severe  Pneumonia  Acute respiratory distress with hypoxia, improved  Acute kidney injury  Generalized debilitation and weakness.     Recommendation  Continue current supportive care  We will DC Keppra  Continue hydration  Antibiotics  PT and OT  Speech evaluation  Aspirin  Statin  Continue Aricept  Blood pressure monitor and neurochecks  Continue home blood pressure medications  Can be discharged from neurology when medically stable  No further recommendation  We will sign off. Sonia Hurtado MD   [phone]      This dictation was generated by voice recognition computer software. Although all attempts are made to edit the dictation for accuracy, there may be errors in the transcription that are not intended.

## 2020-07-08 NOTE — PLAN OF CARE
Problem: Falls - Risk of:  Goal: Will remain free from falls  Description: Will remain free from falls  Outcome: Ongoing  Goal: Absence of physical injury  Description: Absence of physical injury  Outcome: Ongoing     Problem: Skin Integrity:  Goal: Will show no infection signs and symptoms  Description: Will show no infection signs and symptoms  Outcome: Ongoing  Goal: Absence of new skin breakdown  Description: Absence of new skin breakdown  Outcome: Ongoing     Problem: Nutrition  Goal: Optimal nutrition therapy  Outcome: Ongoing     Problem: Pain:  Goal: Pain level will decrease  Description: Pain level will decrease  Outcome: Ongoing  Goal: Control of acute pain  Description: Control of acute pain  Outcome: Ongoing  Goal: Control of chronic pain  Description: Control of chronic pain  Outcome: Ongoing

## 2020-07-08 NOTE — PROCEDURES
Patient: Dania Gomez    MR Number: [id number]  YOB: 1930  Date of Visit: 7/8/2020    Clinical History:  The patient is a 80y.o. years old female with persistent encephalopathy concerning for new onset seizure. Method: The EEG was performed utilizing the international 10/20 of electrode placements of both referential and bipolar montages. The patient was awake and drowsy through out the recording. Findings: The background of the EEG showed normal alpha posterior background of 8  HZ and amplitude of 20-40 UV. This background was symmetric, waxing and waning, and reactive with eye opening and closure. As the patient became drowsy, generalized diffuse slowing was seen through recording at 6-7 HZ. This generalized slowing was symmetric, non rhythmical, and continuous. No spike or sharp waves were seen. Impression: This EEG  is within normal limits. There is no evidence of epileptiform discharges, focal, or lateralizing abnormalities.       Camille Quinones MD      Board certified in clinical neurophysiology

## 2020-07-08 NOTE — PLAN OF CARE
Problem: Falls - Risk of:  Goal: Will remain free from falls  Description: Will remain free from falls  7/8/2020 1014 by Omar Espino RN  Outcome: Ongoing  Note: Patient remains absent from falls at this time. Remains alert and oriented to self, in bed with call light and belongings in reach. Non-slip footwear on and 2/4 siderails raised. Bed remains in lowest/locked position at all times with alarm activated. Fall precautions in place. Camera in room. Patient encouraged to use call light to request assistance, v/u.  Will continue to monitor. Problem: Skin Integrity:  Goal: Absence of new skin breakdown  Description: Absence of new skin breakdown  7/8/2020 1014 by Omar Espino RN  Note: Patient Kishor score 18, assisted to turn and reposition every 2 hours and as needed with pillow support provided. Dressing to left heel and sacrum remains clean, dry and intact. Bilateral heel protectors in place. Rosenberg remains in place. No new skin issues noted. Will continue to monitor. Problem: Pain:  Goal: Pain level will decrease  Description: Pain level will decrease  7/8/2020 1014 by Omar Espino RN  Outcome: Ongoing  Note: Patient denies any pain at this time. Will continue to monitor.

## 2020-07-09 VITALS
RESPIRATION RATE: 16 BRPM | DIASTOLIC BLOOD PRESSURE: 78 MMHG | OXYGEN SATURATION: 97 % | HEIGHT: 64 IN | BODY MASS INDEX: 24.43 KG/M2 | TEMPERATURE: 98.7 F | HEART RATE: 57 BPM | SYSTOLIC BLOOD PRESSURE: 163 MMHG | WEIGHT: 143.08 LBS

## 2020-07-09 LAB
BLOOD CULTURE, ROUTINE: NORMAL
CULTURE, BLOOD 2: NORMAL
D DIMER: >5250 NG/ML DDU (ref 0–229)
FIBRINOGEN: 494 MG/DL (ref 200–397)

## 2020-07-09 PROCEDURE — 94761 N-INVAS EAR/PLS OXIMETRY MLT: CPT

## 2020-07-09 PROCEDURE — 36415 COLL VENOUS BLD VENIPUNCTURE: CPT

## 2020-07-09 PROCEDURE — 2580000003 HC RX 258: Performed by: FAMILY MEDICINE

## 2020-07-09 PROCEDURE — 97166 OT EVAL MOD COMPLEX 45 MIN: CPT

## 2020-07-09 PROCEDURE — 92526 ORAL FUNCTION THERAPY: CPT

## 2020-07-09 PROCEDURE — 85379 FIBRIN DEGRADATION QUANT: CPT

## 2020-07-09 PROCEDURE — 6370000000 HC RX 637 (ALT 250 FOR IP): Performed by: FAMILY MEDICINE

## 2020-07-09 PROCEDURE — 97530 THERAPEUTIC ACTIVITIES: CPT

## 2020-07-09 PROCEDURE — 85384 FIBRINOGEN ACTIVITY: CPT

## 2020-07-09 PROCEDURE — 94640 AIRWAY INHALATION TREATMENT: CPT

## 2020-07-09 PROCEDURE — 6360000002 HC RX W HCPCS: Performed by: INTERNAL MEDICINE

## 2020-07-09 PROCEDURE — 97162 PT EVAL MOD COMPLEX 30 MIN: CPT

## 2020-07-09 PROCEDURE — 6370000000 HC RX 637 (ALT 250 FOR IP): Performed by: INTERNAL MEDICINE

## 2020-07-09 RX ORDER — LEVOFLOXACIN 250 MG/1
250 TABLET ORAL DAILY
Qty: 3 TABLET | Refills: 0 | Status: SHIPPED | OUTPATIENT
Start: 2020-07-09 | End: 2020-07-12

## 2020-07-09 RX ADMIN — ENOXAPARIN SODIUM 30 MG: 30 INJECTION SUBCUTANEOUS at 09:38

## 2020-07-09 RX ADMIN — METOPROLOL TARTRATE 50 MG: 50 TABLET, FILM COATED ORAL at 09:38

## 2020-07-09 RX ADMIN — BUDESONIDE AND FORMOTEROL FUMARATE DIHYDRATE 2 PUFF: 80; 4.5 AEROSOL RESPIRATORY (INHALATION) at 07:16

## 2020-07-09 RX ADMIN — PRAVASTATIN SODIUM 40 MG: 40 TABLET ORAL at 09:39

## 2020-07-09 RX ADMIN — Medication 10 ML: at 09:46

## 2020-07-09 RX ADMIN — IPRATROPIUM BROMIDE AND ALBUTEROL SULFATE 1 AMPULE: .5; 3 SOLUTION RESPIRATORY (INHALATION) at 16:38

## 2020-07-09 RX ADMIN — IPRATROPIUM BROMIDE AND ALBUTEROL SULFATE 1 AMPULE: .5; 3 SOLUTION RESPIRATORY (INHALATION) at 11:35

## 2020-07-09 RX ADMIN — IPRATROPIUM BROMIDE AND ALBUTEROL SULFATE 1 AMPULE: .5; 3 SOLUTION RESPIRATORY (INHALATION) at 07:16

## 2020-07-09 ASSESSMENT — PAIN SCALES - GENERAL
PAINLEVEL_OUTOF10: 0

## 2020-07-09 NOTE — PROGRESS NOTES
Speech Language Pathology  Dysphagia Treatment Note    Name:  Reina Conklin  :   3/29/1930  Medical Diagnosis:  Pneumonia [J18.9]  Pneumonia [J18.9]  Treatment Diagnosis: Oropharyngeal Dysphagia  Pain level: denies     Current Diet Level: Dysphagia II Minced and Moist with thin liquids/no straws/meds with puree  Tolerance of Current Diet Level:Per chart review, no documented difficulties with current diet level noted. Assessment of Texture Tolerance:  -Impressions: Pt was positioned upright in bed. She requires one to one assist with intake. Trials of soft solids revelaed moderately prolonged mastication, decreased bolus propulsion and suspected delayed swallow initiation. Oral clearing was effective. With thin liquids pharyngeal pooling was suspected with delayed swallow initiation. No overt clinical s/s of aspiration/penetration assessed this date. Pt demonstrates increased risk for aspiration due to inability to self feed, suspected delayed swallow initiation and overall dysphagia. Recommend continue current diet. Diet and Treatment Recommendations:  Dysphagia II Minced and Moist with thin liquids/no straws/meds with puree     (Dysphagia Goals addressed, if appropriate)  1.) Pt will tolerate recommended diet without s/s of aspiration (ongoing 2020)      Plan:  Continued daily Dysphagia treatment with goals per plan of care. Patient/Family Education:Education given to the Pt and nurse, who verbalized understanding    Discharge Recommendations:  Pt will benefit from continued skilled Speech Therapy for Dysphagia services, prior to returning home. Timed Code Treatment:  0 minutes     Total Treatment Time:  15 minutes     If patient discharges prior to next session this note will serve as a discharge summary.      Kaylin Sandhu, [address] [facility]  Speech Language Pathologist

## 2020-07-09 NOTE — PROGRESS NOTES
[facility] home care referral. Spoke with pt and re: home care plan of care/services. Agreeable. Demographic's verified. Will follow for home care.

## 2020-07-09 NOTE — PROGRESS NOTES
Hospitalist Progress Note      PCP: Brenda Camara MD    Date of Admission: 7/5/2020    Chief Complaint:  [address] Course:    Pedro Noriega is a 80 y.o. female with h/o COPD, CKD, dementia, HTN, brought in by family with worsening confusion, lethargy and decreased respoinsiveness. She has cough in the past a few days. No fever, chills. Chest xray showed concern for pneumonia. This morning she was almost unresponsive prompting me to get another CT head to rule out a bleed. There as no bleed present. She has some fasiculation like movement of her toes and fluttering of her eyes which make me think that she may be having seizures. Pupils where pinpoint and she did not respond to narcan   She will get a little more responsive and then get worse again. She appears to he waxing and waning a but. Neuro consulted. I have loaded her with Keppra. Subjective:   She is awake and following commands today.   Looks good, neuro stopped keppra      Medications:  Reviewed    Infusion Medications   Scheduled Medications    ipratropium-albuterol  1 ampule Inhalation Q4H WA    LORazepam  1 mg Intravenous Once    enoxaparin  30 mg Subcutaneous Daily    cefTRIAXone (ROCEPHIN) IV  1 g Intravenous Q24H    azithromycin  500 mg Intravenous Q24H    donepezil  10 mg Oral Nightly    metoprolol tartrate  50 mg Oral BID    pravastatin  40 mg Oral Daily    risperiDONE  0.5 mg Oral Nightly    sodium chloride flush  10 mL Intravenous 2 times per day    budesonide-formoterol  2 puff Inhalation BID     PRN Meds: sodium chloride flush, acetaminophen **OR** acetaminophen, polyethylene glycol, promethazine **OR** ondansetron      Intake/Output Summary (Last 24 hours) at 7/9/2020 1608  Last data filed at 7/9/2020 0930  Gross per 24 hour   Intake 480 ml   Output 775 ml   Net -295 ml       Physical Exam Performed:    /76   Pulse 70   Temp 98.3 °F (36.8 °C) (Oral)   Resp 18   Ht 5' 4\" (1.626 m)   Wt 143 lb 1.3 oz (64.9 kg)   SpO2 95%   BMI 24.56 kg/m²     General appearance: No apparent distress, appears stated age and cooperative. Answers questions   HEENT: Pupils equal pinpoint. Conjunctivae/corneas clear. Neck: Supple, with full range of motion. No jugular venous distention. Trachea midline. Respiratory:  Normal respiratory effort. But breathing shallow. Clear to auscultation, bilaterally without Rales/Wheezes/Rhonchi. Cardiovascular: Regular rate and rhythm with normal S1/S2 without murmurs, rubs or gallops. Abdomen: Soft, non-tender, non-distended with normal bowel sounds. Musculoskeletal: No clubbing, cyanosis or edema bilaterally. Not really moving. Skin: Skin color, texture, turgor normal.  No rashes or lesions. Neurologic:  Grossly intact. . Moves all limbs  Psychiatric: Alert to self, follows commands. Capillary Refill: Brisk,< 3 seconds   Peripheral Pulses: +2 palpable, equal bilaterally       Labs:   Recent Labs     07/08/20  1533   WBC 6.2   HGB 12.5   HCT 38.1        Recent Labs     07/08/20  1534      K 4.2      CO2 27   BUN 23*   CREATININE 1.2   CALCIUM 9.8     No results for input(s): AST, ALT, BILIDIR, BILITOT, ALKPHOS in the last 72 hours. No results for input(s): INR in the last 72 hours. No results for input(s): Mick Keel in the last 72 hours. Urinalysis:      Lab Results   Component Value Date    NITRU Negative 07/05/2020    WBCUA 20-40 04/08/2013    BACTERIA 1+ 07/05/2020    RBCUA 0-2 07/05/2020    BLOODU TRACE-INTACT 07/05/2020    SPECGRAV >=1.030 07/05/2020    GLUCOSEU Negative 07/05/2020    GLUCOSEU NEGATIVE 01/11/2012       Radiology:  CT HEAD WO CONTRAST   Final Result   No acute intracranial abnormality. No change in the appearance of the study   since yesterday. Moderate to severe cerebral atrophy present appropriate for age. Large   amount of chronic ischemic change also age appropriate.          CT HEAD WO CONTRAST   Final Result   No acute intracranial abnormality. XR CHEST PORTABLE   Final Result   Patchy opacities in the mid to lower lungs bilaterally. Pneumonia and edema   considered. Assessment/Plan:    Active Hospital Problems    Diagnosis    Altered mental status [R41.82]    Acute encephalopathy [G93.40]    Suspected COVID-19 virus infection [Z20.828]    MICHELLE (acute kidney injury) ([facility] [address] [N17.9]    Pneumonia due to organism [J18.9]    HTN (hypertension), benign [I10]    Dementia due to Alzheimer's disease ([facility] [address] [G30.9, F02.80]     AMS  -Suspect due to seizures cvs pneumonia. - will discuss with neuro  - loaded with Keppra  - EEG with no seizure activity  -stop keppra    Sepsis- poa  - due to pneumonia. - see Below.   - abx    Pneumonia  Cultures including COVID is sent  Will treat with IV abx  -COVID was not detected. - transition to oral levaquin at dc    MICHELLE on CKD  - better after fluids. Hx of previous strokes  - based off of CT  - nothing suggestive of new stroke on the new ct. Consider MRI     Dementia  - unclear how progressed or what patient was like at baseline     necrotic pressure ulcer of the left  Heel, POA  DVT Prophylaxis: lovenox  Diet: Dietary Nutrition Supplements: Frozen Oral Supplement  DIET DYSPHAGIA MINCED AND MOIST; No Drinking Straw  Code Status: DNR-CCA    PT/OT Eval Status: eval and treat  When improved.      Mariah Freeman MD

## 2020-07-09 NOTE — PLAN OF CARE
Problem: Falls - Risk of:  Goal: Will remain free from falls  Description: Will remain free from falls  Outcome: Met This Shift     Problem: Skin Integrity:  Goal: Will show no infection signs and symptoms  Description: Will show no infection signs and symptoms  Outcome: Met This Shift   Pt remained free of falls and injury as well as no new s/s of new skin breakdown

## 2020-07-09 NOTE — PROGRESS NOTES
Occupational Therapy   Occupational Therapy Initial Assessment/Treatment   Date: 2020   Patient Name: Chelsea Gutiérrez  MRN: [id number]     : 3/29/1930    Date of Service: 2020    Discharge Recommendations:     OT Equipment Recommendations  Equipment Needed: Yes  Mobility Devices: ADL Assistive Devices; Toileting - Standard Commode, gait belt, w/c for safe functional mobility. Itzel Baeza scored a  on the AM-PAC ADL Inpatient form. Current research shows that an AM-PAC score of 17 or less is typically not associated with a discharge to the patient's home setting. Based on the patient's AM-PAC score and their current ADL deficits, it is recommended that the patient have 3-5 sessions per week of Occupational Therapy at d/c to increase the patient's independence. Please see assessment section for further patient specific details. Per chart review, it is families hope to return home. OT recommending x2 caregivers to assist.    If patient discharges prior to next session this note will serve as a discharge summary. Please see below for the latest assessment towards goals. Assessment   Performance deficits / Impairments: Decreased endurance;Decreased ADL status; Decreased cognition;Decreased balance;Decreased strength  Assessment: Patient is a 80yr old female admitted worsening confusion, lethargy and decreased respoinsiveness. Patient with hx of dementia and is a poor historian. Per chart review, patient is non-ambulatory at baseline and completes stand pivots to w/c with assistance. Patient requires max assist for ADLs. Patient currently required max assistance to sit to EOB. Patient unable to safely complete stand pivot this date despite max/total assistance from OT. OT attempted x3, but unable to safely pivot to chair at this time. Patient is functioning below baseline. Anticipate the need for 2 caregivers to assist patient, if patiet returns home.  Patient would benefit from ongoing OT in order to increase functional status. Treatment Diagnosis: Decline in functional status s/p AMS   Prognosis: Fair  Decision Making: Medium Complexity  Assistance / Modification: max-total assist  OT Education: OT Role;Plan of Care;Energy Conservation;Transfer Training;ADL Adaptive Strategies  Barriers to Learning: Cognition   REQUIRES OT FOLLOW UP: Yes  Activity Tolerance  Activity Tolerance: Patient limited by fatigue  Safety Devices  Safety Devices in place: Yes  Type of devices: All fall risk precautions in place; Left in bed;Nurse notified;Call light within reach; Bed alarm in place; Patient at risk for falls           Patient Diagnosis(es): The primary encounter diagnosis was Altered mental status, unspecified altered mental status type. Diagnoses of Pneumonia due to organism and Suspected COVID-19 virus infection were also pertinent to this visit. has a past medical history of Alzheimer disease ([facility] [address] Bronchitis, COPD (chronic obstructive pulmonary disease) ([facility] [address] Hyperlipidemia, and Hypertension. has no past surgical history on file. Treatment Diagnosis: Decline in functional status s/p AMS       Restrictions  Restrictions/Precautions  Restrictions/Precautions: Fall Risk, Modified Diet(high fall risk; MINCED AND MOIST; No Drinking Straw )  Position Activity Restriction  Other position/activity restrictions:  Itzel Arias is a 80 y. o. female with h/o COPD, CKD, dementia, HTN, brought in by family with worsening confusion, lethargy and decreased respoinsiveness. She has cough in the past a few days. No fever, chills. Chest xray showed concern for pneumonia. Subjective   General  Chart Reviewed: Yes, Orders, Progress Notes, History and Physical, Imaging  Additional Pertinent Hx: Dementia  Family / Caregiver Present: No  Diagnosis: Altered Mental Status   Subjective  Subjective: patient very soft spoken, agreeable to OT evaluation. Patient oriented to self only and poor historian of PLOF.   General Comment  Comments: RN okay for therapy   Patient Currently in Pain: Denies  Vital Signs  Patient Currently in Pain: Denies  Social/Functional History  Social/Functional History  Lives With: Daughter  Type of Home: House  Home Layout: One level  Bathroom Toilet: Standard  Bathroom Accessibility: Wheelchair accessible(per pt report)  Home Equipment: Hospital bed, Lift chair, [address] Help From: Family  ADL Assistance: Needs assistance(Per chart review, max A for ADLs)  Homemaking Assistance: Needs assistance(daughter assists with all cooking/cleaning and laundry)  Homemaking Responsibilities: No  Ambulation Assistance: RW with assist of 1  Transfer Assistance: Needs assistance(pt requires assistance of 1-2 sit to stand)  Additional Comments: patient is a poor historian; PLOF from chart review and PT conversation with daughter (2nd session)       Objective              Balance  Sitting Balance: Stand by assistance(unsupported sitting at EOB)  Standing Balance: Maximum assistance(posterior lean; x3 attempts)  Functional Mobility  Functional Mobility Comments: patient non-ambulatory at baseline  ADL  Feeding: (Patient with lunch tray, but declining to eat)  Tone RUE  RUE Tone: Normotonic  Tone LUE  LUE Tone: Normotonic  Coordination  Movements Are Fluid And Coordinated: Yes        Transfers  Stand Pivot Transfers: Dependent/Total(x3 attempt; unable to safely complete with 1 person)     Cognition  Overall Cognitive Status: Exceptions  Arousal/Alertness: Inconsistent responses to stimuli;Delayed responses to stimuli  Following Commands: Inconsistently follows commands  Attention Span: Unable to maintain attention; Difficulty attending to directions  Memory: Decreased short term memory;Decreased recall of biographical Information  Problem Solving: Assistance required to identify errors made;Assistance required to implement solutions;Assistance required to correct errors made;Assistance required to generate 1453)    Goals  Short term goals  Time Frame for Short term goals: by discharge   Short term goal 1: Complete stand pivot tx to bedside commode with min A   Short term goal 2: Tolerate static stance for 1 min in order to decrease caregiver burden for toileting        Therapy Time   Individual Concurrent Group Co-treatment   Time In 1301         Time Out 1348         Minutes 47         Timed Code Treatment Minutes: 32 Minutes(15 minute evaluation)       Second Session Therapy Time:   Individual Concurrent Group Co-treatment   Time In        1529   Time Out        1615   Minutes        46     Timed Code Treatment Minutes:  47 min + 46 min (Total 93 min)    Total Treatment Minutes:  47 min + 46 min (Total 93 min)    Marika Gold, OTR/L   [facility], OTR/L, [id number] (2nd session)

## 2020-07-09 NOTE — PROGRESS NOTES
Physical Therapy    Facility/Department: [address]  Initial Assessment    NAME: James Winston  : 3/29/1930  MRN: [id number]    Date of Service: 2020    Discharge Recommendations:  James Winston scored a  on the AM-PAC short mobility form. Current research shows that an AM-PAC score of 18 or greater is typically associated with a discharge to the patient's home setting. Based on the patient's AM-PAC score and their current functional mobility deficits, it is recommended that the patient have 2-3 sessions per week of Physical Therapy at d/c to increase the patient's independence. At this time, this patient demonstrates the endurance and safety to discharge home with home services and a follow up treatment frequency of 2-3x/wk. Please see assessment section for further patient specific details. Family refusing non-home discharge. Recommend 24 hour supervision and below DME to facilitate safe discharge home. HOME HEALTH CARE: LEVEL 2 SOCIAL     - Initial home health evaluation to occur within 24-48 hours, in patient home   - Therapy to evaluate with goal of regaining prior level of functioning   - Therapy to evaluate if patient has [address] needs for personal care   -  evaluation within 24-48 hours, includes evaluation of resources and insurance to determine AL/IL/LTC/Medicaid options   - [place] [facility] Referral   - [address] to discuss home support and care needs post discharge within two weeks of discharge. If patient discharges prior to next session this note will serve as a discharge summary. Please see below for the latest assessment towards goals.      S Level 2   PT Equipment Recommendations  Equipment Needed: Yes  Mobility Devices: Wheelchair;Transport Devices(Gait belt for safe transfers)  Wheelchair: Transport Chair  Transport Devices: Patient Mechanical Lift  Other: Patient needs w/c for primary means of mobility as patient is unable to ambulate at this time. Patient unable to self propel w/c due to congitive status and would benefit from transport chair. Patient currently 2-person assist and recommned mechanical lift for safety    Assessment   Body structures, Functions, Activity limitations: Decreased functional mobility ; Decreased strength;Decreased safe awareness;Decreased endurance;Decreased balance  Assessment: Patient not at baseline function and would benefit from skilled PT to address above deficits and facilitate return to baseline function. Discussed current status with daughter and despite decline in function daughter very clear they wanted patient to discharge home. Willing to have increased DME and home therapy but not rehab stay. Patient will have 24 hour supervision and typcially requires 1-2 person assistance. Cannot perfor car transfer safely at this time and would need medical transportation into home. Treatment Diagnosis: decreased functional mobility, impaired gait, decreased balance  Prognosis: Fair  Decision Making: Medium Complexity  Clinical Presentation: evolving  PT Education: Goals; Family Education;PT Role;Plan of Care;Equipment  Patient Education: discussed d/c recommendations, current status, need for therapy and safety concerns with home discharge with daughter. Discussed DME needs to facilitate safety with home discharge. Daughter verbalized understanding  Barriers to Learning: cognitive  REQUIRES PT FOLLOW UP: Yes  Activity Tolerance  Activity Tolerance: Patient Tolerated treatment well       Patient Diagnosis(es): The primary encounter diagnosis was Altered mental status, unspecified altered mental status type. Diagnoses of Pneumonia due to organism, Suspected COVID-19 virus infection, and Dementia due to Alzheimer's disease [facility]) were also pertinent to this visit.      has a past medical history of Alzheimer disease ([facility] [address] Bronchitis, COPD (chronic obstructive pulmonary disease) ([facility] [address] Hyperlipidemia, and Hypertension. has no past surgical history on file. Restrictions  Restrictions/Precautions  Restrictions/Precautions: Fall Risk, Modified Diet(high fall risk; MINCED AND MOIST; No Drinking Straw )  Required Braces or Orthoses?: No  Position Activity Restriction  Other position/activity restrictions:  Itzel Arias is a 80 y. o. female with h/o COPD, CKD, dementia, HTN, brought in by family with worsening confusion, lethargy and decreased respoinsiveness. She has cough in the past a few days. No fever, chills. Chest xray showed concern for pneumonia. Vision/Hearing  Vision: Within Functional Limits  Hearing: Within functional limits     Subjective  General  Chart Reviewed: Yes  Family / Caregiver Present: No  Diagnosis: PNA  Follows Commands: Impaired  Other (Comment): follows 1-step commands with increased cues  General Comment  Comments: supine in bed upon arrival with alarm on  Subjective  Subjective: Denied pain. Agreeable to therapy. Confusion noted. Pain Screening  Patient Currently in Pain: Denies          Orientation  Orientation  Overall Orientation Status: Impaired  Orientation Level: Oriented to person;Disoriented to place; Disoriented to time;Oriented to situation  Social/Functional History  Social/Functional History  Lives With: Daughter  Type of Home: House  Home Layout: One level  Home Access: Stairs to enter without rails  Entrance Stairs - Number of Steps: 1 POLA  Bathroom Toilet: Standard  Bathroom Accessibility: Wheelchair accessible(per pt report)  Home Equipment: Hospital bed, Lift chair, Rolling walker  Receives Help From: Family  ADL Assistance: Needs assistance(patient performs grooming, unable to feed self, max A for bathing and dressing, assist toileting)  Homemaking Assistance: Needs assistance(daughter assists with all cooking/cleaning and laundry)  Homemaking Responsibilities: No  Ambulation Assistance: Needs assistance(with RW and family assistance)  Transfer Assistance: Needs assistance(1-2 person assist for sit to stand transfers)  Additional Comments: Patient unable to provide information. Discussed with daughter following evaluation via phone call. Objective          AROM RLE (degrees)  RLE AROM: WFL  AROM LLE (degrees)  LLE AROM : WFL  Strength Other  Other: unable to formally assess due to decreased command following, generalized weakness noted        Bed mobility  Rolling to Right: Moderate assistance  Supine to Sit: Stand by assistance  Sit to Supine: Moderate assistance  Scooting: Moderate assistance  Transfers  Sit to Stand: (unable to achieve full stance)  Squat Pivot Transfers: Dependent/Total(max A of 2 bed <>chair)        Balance  Sitting - Static: Good  Sitting - Dynamic: [address]  Times per week: 3-5  Times per day: Daily  Current Treatment Recommendations: Strengthening, Balance Training, Endurance Training, Gait Training, Transfer Training, Home Exercise Program, Safety Education & Training  Safety Devices  Type of devices: All fall risk precautions in place, Call light within reach, Bed alarm in place, Nurse notified, Left in bed, Patient at risk for falls, Telesitter in use  Restraints  Initially in place: No      AM-PAC Score  AM-PAC Inpatient Mobility Raw Score : 9 (07/09/20 1653)  AM-PAC Inpatient T-Scale Score : 30.55 (07/09/20 1653)  Mobility Inpatient CMS 0-100% Score: 81.38 (07/09/20 1653)  Mobility Inpatient CMS G-Code Modifier : CM (07/09/20 1653)          Goals  Short term goals  Time Frame for Short term goals:  To be met prior to discharge  Short term goal 1: Bed mobility with supervision  Short term goal 2: Sit to/from stand with max A  Short term goal 3: Bed to/from chair with max A  Patient Goals   Patient goals : unable to state       Therapy Time   Individual Concurrent Group Co-treatment   Time In 1529         Time Out 1615         Minutes 46         Timed Code Treatment Minutes: Alie 159, PT    Thanks, Ashlyn Veronica, PT, DPT [id number]

## 2020-07-09 NOTE — PROGRESS NOTES
Nutrition Assessment    Type and Reason for Visit: Reassess    Nutrition Recommendations:   1. Continue current diet per SLP  2. Increase magic cup to TID    Nutrition Assessment: Pt's nutrition status stablilized AEB improved po intake & Pt weight is stable. Pt diet upgraded to Dysphagia minced and moist. SLP has been helping pt finish magic cup. RN states that pt ate 5 spoons of applesauce this AM and that pt needs encouragement to finish meals. Will increase magic cups to TID to help maximize protein intake to promote healing. Will con't to monitor progress. Malnutrition Assessment:  · Malnutrition Status: Insufficient data  · Context:    · Findings of the 6 clinical characteristics of malnutrition (Minimum of 2 out of 6 clinical characteristics is required to make the diagnosis of moderate or severe Protein Calorie Malnutrition based on AND/ASPEN Guidelines):  1. Energy Intake-Less than or equal to 50% of estimated energy requirement(prior to 7/8), Unable to assess    2. Weight Loss-No significant weight loss,    3. Fat Loss-Unable to assess,    4. Muscle Loss-Unable to assess,    5. Fluid Accumulation-No significant fluid accumulation,    6.  Strength-Not measured    Nutrition Risk Level:  Moderate    Nutrient Needs:  · Estimated Daily Total Kcal: 1,625- 1,950  · Estimated Daily Protein (g): 78-98 grams  · Estimated Daily Total Fluid (ml/day): 1mL/ kcal    Nutrition Diagnosis:   · Problem: Increased nutrient needs  · Etiology: related to Increased demand for energy/nutrients(confusion, dementia)     Signs and symptoms:  as evidenced by Presence of wounds(Nurse statement)    Objective Information:  · Nutrition-Focused Physical Findings: Active BS; no edema noted  · Wound Type: Unstageable, Stage II(st 2- coccyx, unstageable- L heel)  · Current Nutrition Therapies:  · Oral Diet Orders: Dysphagia Minced and Moist (Dysphagia 2), No Straws   · Oral Diet intake: 1-25%, 51-75%, %(greater than 50% of meals on 7/8)  · Oral Nutrition Supplement (ONS) Orders: Frozen Oral Supplement(Magic Cup)  · ONS intake: %  · Anthropometric Measures:  · Ht: 5' 4\" (162.6 cm)   · Current Body Wt: 143 lb (64.9 kg)  · Admission Body Wt: 138 lb (62.6 kg)  · Ideal Body Wt: 120 lb (54.4 kg), % Ideal Body    · Adjusted Body Wt:  , body weight adjusted for    · BMI Classification: BMI 18.5 - 24.9 Normal Weight    Nutrition Interventions:   Continue current diet, Modify current ONS  Continued Inpatient Monitoring, Education Not Indicated    Nutrition Evaluation:   · Evaluation: Progressing toward goals   · Goals: Pt will consume at least 50% of ONS and meals     · Monitoring: Meal Intake, Supplement Intake, Wound Healing, Weight      Electronically signed by Augusta Coleman on 7/9/20 at 11:18 AM EDT    Contact Number: [phone]

## 2020-07-09 NOTE — CARE COORDINATION
Patient discharged 7-9-20 home at 7pm via first care to [address] via [facility]. MD notified DME recommendations referred to [facility]. Home care by [facility], and notified Briana Kwon Waiver coordinator [phone], fax [phone].     All discharge needs met per case management

## 2020-07-09 NOTE — PROGRESS NOTES
Pt discharged via [facility], IV/[place]/Telemetry removed. Vitals stable.

## 2020-07-09 NOTE — DISCHARGE SUMMARY
Hospital Medicine Discharge Summary    Patient: Leo Cruz     Gender: female  : 3/29/1930   Age: 80 y.o. MRN: [id number]    Admitting Physician: Anthony Baugh MD  Discharge Physician: Trang Donald MD     Code Status: DNR-CCA     Admit Date: 2020   Discharge Date:   2020    Disposition:  Home with home care    Discharge Diagnoses: Active Hospital Problems    Diagnosis Date Noted    Altered mental status [R41.82]     Acute encephalopathy [G93.40]     Suspected COVID-19 virus infection [Z20.828]     MICHELLE (acute kidney injury) ([facility] [address] [N17.9]     Pneumonia due to organism [J18.9] 2020    HTN (hypertension), benign [I10] 2015    Dementia due to Alzheimer's disease ([facility] [address] [G30.9, F02.80] 2014       Follow-up appointments:  two weeks    Outpatient to do list: follow up    Condition at Discharge:  Stable    Hospital Course:   Clarissa Pendleton a 80 y. o. female with h/o COPD, CKD, dementia, HTN, brought in by family with worsening confusion, lethargy and decreased respoinsiveness. She has cough in the past a few days. No fever, chills. Chest xray showed concern for pneumonia. This morning she was almost unresponsive prompting me to get another CT head to rule out a bleed. There as no bleed present. She has some fasiculation like movement of her toes and fluttering of her eyes which make me think that she may be having seizures. Pupils where pinpoint and she did not respond to narcan   She will get a little more responsive and then get worse again. She appears to he waxing and waning a but. Neuro consulted. I have loaded her with Keppra. She improved with treatment of her sepsis and pneumonia  EEG no seizure activity    Acute toxic encephalopathy  -Suspect due to  pneumonia. - will discuss with neuro  - loaded with Keppra  - EEG with no seizure activity  -stop keppra  Continue abx  -resolved.     Sepsis- poa  - due to pneumonia. - see Below.    - Component Value Date    WBC 6.2 07/08/2020    HGB 12.5 07/08/2020    HCT 38.1 07/08/2020    MCV 95.2 07/08/2020     07/08/2020     07/08/2020    K 4.2 07/08/2020    K 6.0 07/05/2020     07/08/2020    CO2 27 07/08/2020    BUN 23 07/08/2020    CREATININE 1.2 07/08/2020    CALCIUM 9.8 07/08/2020    PHOS 3.0 03/12/2013    BNP 6 09/24/2013    ALKPHOS 71 07/06/2020    ALT 23 07/06/2020    AST 43 07/06/2020    BILITOT <0.2 07/06/2020    LABALBU 2.8 07/06/2020    LDLCALC 61 08/07/2019    TRIG 58 08/07/2019     Lab Results   Component Value Date    INR 1.09 07/05/2020    INR 1.01 12/11/2014    INR 1.00 04/08/2013       Radiology:  Ct Head Wo Contrast    Result Date: 7/6/2020  EXAMINATION: CT OF THE HEAD WITHOUT CONTRAST  7/6/2020 9:48 am TECHNIQUE: CT of the head was performed without the administration of intravenous contrast. Dose modulation, iterative reconstruction, and/or weight based adjustment of the mA/kV was utilized to reduce the radiation dose to as low as reasonably achievable. COMPARISON: 07/05/2020 HISTORY: ORDERING SYSTEM PROVIDED HISTORY: change in LOC TECHNOLOGIST PROVIDED HISTORY: Reason for exam:->change in LOC Has a \"code stroke\" or \"stroke alert\" been called? ->No Reason for Exam: change in LOC Acuity: Unknown Type of Exam: Unknown FINDINGS: BRAIN/VENTRICLES: There is no acute intracranial hemorrhage, mass effect or midline shift. No abnormal extra-axial fluid collection. The gray-white differentiation is maintained without evidence of an acute infarct. There is prominence of the ventricles and sulci due to global parenchymal volume loss. There are nonspecific areas of hypoattenuation within the periventricular and subcortical white matter, which likely represent chronic microvascular ischemic change. 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 ORBITS: The visualized portion of the orbits demonstrate no acute abnormality.  SINUSES: The visualized paranasal sinuses and mastoid air cells demonstrate no acute abnormality. SOFT TISSUES/SKULL: No acute abnormality of the visualized skull or soft tissues. No acute intracranial abnormality. No change in the appearance of the study since yesterday. Moderate to severe cerebral atrophy present appropriate for age. Large amount of chronic ischemic change also age appropriate. Ct Head Wo Contrast    Result Date: 7/5/2020  EXAMINATION: CT OF THE HEAD WITHOUT CONTRAST  7/5/2020 12:25 pm TECHNIQUE: CT of the head was performed without the administration of intravenous contrast. Dose modulation, iterative reconstruction, and/or weight based adjustment of the mA/kV was utilized to reduce the radiation dose to as low as reasonably achievable. COMPARISON: None. HISTORY: ORDERING SYSTEM PROVIDED HISTORY: AMS TECHNOLOGIST PROVIDED HISTORY: Reason for exam:->AMS Has a \"code stroke\" or \"stroke alert\" been called? ->No Reason for Exam: AMS Acuity: Unknown Type of Exam: Unknown FINDINGS: BRAIN/VENTRICLES: Encephalomalacia is identified within the basal ganglia bilaterally, especially on the right. Moderate to severe periventricular low-attenuation is identified, most compatible with chronic small vessel ischemic disease. Moderate diffuse atrophy is noted. No acute loss of the gray-white matter differentiation is identified to suggest acute or subacute infarct. Intracranial vasculature unremarkable. No midline shift. ORBITS: The visualized portion of the orbits demonstrate no acute abnormality. SINUSES: Mild mucosal thickening within the ethmoid sinus. The paranasal sinuses and mastoid air cells are otherwise clear. SOFT TISSUES/SKULL:  There is diffuse calvarial thickening. No acute calvarial abnormalities. No acute intracranial abnormality.      Xr Chest Portable    Result Date: 7/5/2020  EXAMINATION: ONE XRAY VIEW OF THE CHEST 7/5/2020 10:50 am COMPARISON: 03/18/2015 HISTORY: ORDERING SYSTEM PROVIDED HISTORY: AMS TECHNOLOGIST PROVIDED HISTORY: Reason for exam:->AMS Reason for Exam: Altered Mental Status (brought from home dt family concerns of altered mental status, pt had low bp in route, received 300ml ns) Acuity: Unknown Type of Exam: Unknown FINDINGS: Chronic interstitial opacities are identified. More patchy opacities are seen the mid to lower lungs bilaterally. No pneumothorax is seen. No free air. Cardial pericardial silhouette is unremarkable. Patchy opacities in the mid to lower lungs bilaterally. Pneumonia and edema considered. The patient was seen and examined on day of discharge and this discharge summary is in conjunction with any daily progress note from day of discharge. Time Spent on discharge is 30 minutes  in the examination, evaluation, counseling and review of medications and discharge plan. Note that greater  than 30 minutes was spent in preparing discharge papers, discussing discharge with patient, medication review, etc.       Signed:    Mike Wilder MD   7/9/2020      Thank you Elizabeth Caban MD for the opportunity to be involved in this patient's care.  If you have any questions or concerns please feel free to contact me at [phone]

## 2020-07-10 ENCOUNTER — CARE COORDINATION (OUTPATIENT)
Dept: CASE MANAGEMENT | Age: 85
End: 2020-07-10

## 2020-07-10 NOTE — CARE COORDINATION
[facility] Transitions Initial Follow Up Call    Call within 2 business days of discharge: Yes    Patient: Valentino Hench Patient : 3/29/1930   MRN: [id number]  Reason for Admission:   Discharge Date: 20 RARS: Readmission Risk Score: 15      Last Discharge [facility]       Complaint Diagnosis Description Type Department Provider    20 Altered Mental Status Altered mental status, unspecified altered mental status type . .. ED to Hosp-Admission (Discharged) (ADMITTED) [facility] 3T Jose Armando Perez MD; Margie Garcia. .. Spoke with: pt's daughter, Remy Cahmpagne verified    Facility: [facility]    Non-face-to-face services provided:  Obtained and reviewed discharge summary and/or continuity of care documents    Care Transitions 24 Hour Call    Do you have any ongoing symptoms?:  No  Do you have a copy of your discharge instructions?:  Yes  Do you have all of your prescriptions and are they filled?:  Yes  Have you been contacted by a [address]?:  No  Have you scheduled your follow up appointment?:  No  Were you discharged with any Home Care or Post Acute Services:  Yes  Post Acute Services:  Home Health (Comment: [facility])  Do you feel like you have everything you need to keep you well at home?:  Yes  Care Transitions Interventions  No Identified Needs       Pt's daughter states pt is doing well, no issues or concerns. Denies SOB, CP, cough, fever. Has new med, reviewed all others. Daughter states she has not heard from home care as yet, this nurse informed pt that a call will be made by this nurse to ensure that they have received a referral. Agreed to more [facility] f/u calls, will transition to central team.     PC made to [facility], they have the referral and will be reaching out to the pt. Follow Up  No future appointments.     Renita Goode RN

## 2020-07-16 ENCOUNTER — CARE COORDINATION (OUTPATIENT)
Dept: CASE MANAGEMENT | Age: 85
End: 2020-07-16

## 2022-09-28 NOTE — CARE COORDINATION
Please notify the [facility] Waiver coordinator [phone], fax [phone] of discharge orders. Referred to [address] pending a home care order.   MD, Please complete & sign the e-ANNE under the discharge instructions, AVS/Prescriptions, and or medical necessity note for assistance with discharge planning, Thank you, Lester Fernandez, MSW, [phone] The patient's test results were reviewed. Urine with pyuria. Urine culture was low counts of mixed greg.    probable contamination